# Patient Record
Sex: MALE | Race: WHITE | NOT HISPANIC OR LATINO | Employment: FULL TIME | URBAN - METROPOLITAN AREA
[De-identification: names, ages, dates, MRNs, and addresses within clinical notes are randomized per-mention and may not be internally consistent; named-entity substitution may affect disease eponyms.]

---

## 2018-01-17 ENCOUNTER — TELEPHONE (OUTPATIENT)
Dept: FAMILY MEDICINE | Facility: CLINIC | Age: 58
End: 2018-01-17

## 2020-03-02 ENCOUNTER — TELEPHONE (OUTPATIENT)
Dept: ORTHOPEDICS | Facility: CLINIC | Age: 60
End: 2020-03-02

## 2020-03-02 NOTE — TELEPHONE ENCOUNTER
----- Message from Shannon Tay MA sent at 3/2/2020  8:07 AM CST -----  Contact: DR Abdi Suarez    Wants to discuss coming in   Left hip pain

## 2020-03-02 NOTE — TELEPHONE ENCOUNTER
Called pt back. Advised that Dr. Zhu is in clinic Tuesday and Thursday, his NP is in Friday this week. 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 States that he may call back later to schedule appt. Thanks, Annamarie

## 2022-08-08 ENCOUNTER — OFFICE VISIT (OUTPATIENT)
Dept: ENDOCRINOLOGY | Facility: CLINIC | Age: 62
End: 2022-08-08
Payer: COMMERCIAL

## 2022-08-08 VITALS
DIASTOLIC BLOOD PRESSURE: 62 MMHG | HEIGHT: 70 IN | WEIGHT: 190.5 LBS | BODY MASS INDEX: 27.27 KG/M2 | OXYGEN SATURATION: 98 % | HEART RATE: 66 BPM | SYSTOLIC BLOOD PRESSURE: 138 MMHG

## 2022-08-08 DIAGNOSIS — R73.03 PREDIABETES: Primary | ICD-10-CM

## 2022-08-08 PROCEDURE — 1159F PR MEDICATION LIST DOCUMENTED IN MEDICAL RECORD: ICD-10-PCS | Mod: CPTII,S$GLB,, | Performed by: INTERNAL MEDICINE

## 2022-08-08 PROCEDURE — 1160F RVW MEDS BY RX/DR IN RCRD: CPT | Mod: CPTII,S$GLB,, | Performed by: INTERNAL MEDICINE

## 2022-08-08 PROCEDURE — 3075F PR MOST RECENT SYSTOLIC BLOOD PRESS GE 130-139MM HG: ICD-10-PCS | Mod: CPTII,S$GLB,, | Performed by: INTERNAL MEDICINE

## 2022-08-08 PROCEDURE — 99999 PR PBB SHADOW E&M-EST. PATIENT-LVL IV: CPT | Mod: PBBFAC,,, | Performed by: INTERNAL MEDICINE

## 2022-08-08 PROCEDURE — 1159F MED LIST DOCD IN RCRD: CPT | Mod: CPTII,S$GLB,, | Performed by: INTERNAL MEDICINE

## 2022-08-08 PROCEDURE — 99999 PR PBB SHADOW E&M-EST. PATIENT-LVL IV: ICD-10-PCS | Mod: PBBFAC,,, | Performed by: INTERNAL MEDICINE

## 2022-08-08 PROCEDURE — 3008F BODY MASS INDEX DOCD: CPT | Mod: CPTII,S$GLB,, | Performed by: INTERNAL MEDICINE

## 2022-08-08 PROCEDURE — 3008F PR BODY MASS INDEX (BMI) DOCUMENTED: ICD-10-PCS | Mod: CPTII,S$GLB,, | Performed by: INTERNAL MEDICINE

## 2022-08-08 PROCEDURE — 1160F PR REVIEW ALL MEDS BY PRESCRIBER/CLIN PHARMACIST DOCUMENTED: ICD-10-PCS | Mod: CPTII,S$GLB,, | Performed by: INTERNAL MEDICINE

## 2022-08-08 PROCEDURE — 3078F PR MOST RECENT DIASTOLIC BLOOD PRESSURE < 80 MM HG: ICD-10-PCS | Mod: CPTII,S$GLB,, | Performed by: INTERNAL MEDICINE

## 2022-08-08 PROCEDURE — 99203 PR OFFICE/OUTPT VISIT, NEW, LEVL III, 30-44 MIN: ICD-10-PCS | Mod: S$GLB,,, | Performed by: INTERNAL MEDICINE

## 2022-08-08 PROCEDURE — 99203 OFFICE O/P NEW LOW 30 MIN: CPT | Mod: S$GLB,,, | Performed by: INTERNAL MEDICINE

## 2022-08-08 PROCEDURE — 3078F DIAST BP <80 MM HG: CPT | Mod: CPTII,S$GLB,, | Performed by: INTERNAL MEDICINE

## 2022-08-08 PROCEDURE — 3075F SYST BP GE 130 - 139MM HG: CPT | Mod: CPTII,S$GLB,, | Performed by: INTERNAL MEDICINE

## 2022-08-08 RX ORDER — CLONAZEPAM 0.5 MG/1
0.5 TABLET ORAL 2 TIMES DAILY PRN
COMMUNITY

## 2022-08-08 RX ORDER — DILTIAZEM HYDROCHLORIDE 180 MG/1
180 CAPSULE, EXTENDED RELEASE ORAL DAILY
COMMUNITY

## 2022-08-08 RX ORDER — MELATONIN 3 MG
2 CAPSULE ORAL NIGHTLY PRN
COMMUNITY

## 2022-08-08 RX ORDER — FAMOTIDINE 20 MG/1
40 TABLET, FILM COATED ORAL NIGHTLY
COMMUNITY

## 2022-08-08 RX ORDER — UBIDECARENONE 75 MG
500 CAPSULE ORAL DAILY
COMMUNITY

## 2022-08-08 RX ORDER — ALFUZOSIN HYDROCHLORIDE 10 MG/1
10 TABLET, EXTENDED RELEASE ORAL
COMMUNITY

## 2022-08-12 ENCOUNTER — LAB VISIT (OUTPATIENT)
Dept: LAB | Facility: HOSPITAL | Age: 62
End: 2022-08-12
Attending: INTERNAL MEDICINE
Payer: COMMERCIAL

## 2022-08-12 DIAGNOSIS — R73.03 PREDIABETES: ICD-10-CM

## 2022-08-12 LAB
ALBUMIN SERPL BCP-MCNC: 4.1 G/DL (ref 3.5–5.2)
ALP SERPL-CCNC: 51 U/L (ref 55–135)
ALT SERPL W/O P-5'-P-CCNC: 28 U/L (ref 10–44)
ANION GAP SERPL CALC-SCNC: 9 MMOL/L (ref 8–16)
AST SERPL-CCNC: 23 U/L (ref 10–40)
BILIRUB SERPL-MCNC: 1.5 MG/DL (ref 0.1–1)
BUN SERPL-MCNC: 22 MG/DL (ref 8–23)
CALCIUM SERPL-MCNC: 9.7 MG/DL (ref 8.7–10.5)
CHLORIDE SERPL-SCNC: 103 MMOL/L (ref 95–110)
CHOLEST SERPL-MCNC: 123 MG/DL (ref 120–199)
CHOLEST/HDLC SERPL: 2.8 {RATIO} (ref 2–5)
CO2 SERPL-SCNC: 27 MMOL/L (ref 23–29)
CREAT SERPL-MCNC: 1 MG/DL (ref 0.5–1.4)
EST. GFR  (NO RACE VARIABLE): >60 ML/MIN/1.73 M^2
GLUCOSE SERPL-MCNC: 102 MG/DL (ref 70–110)
HDLC SERPL-MCNC: 44 MG/DL (ref 40–75)
HDLC SERPL: 35.8 % (ref 20–50)
LDLC SERPL CALC-MCNC: 64.8 MG/DL (ref 63–159)
NONHDLC SERPL-MCNC: 79 MG/DL
POTASSIUM SERPL-SCNC: 3.9 MMOL/L (ref 3.5–5.1)
PROT SERPL-MCNC: 7.3 G/DL (ref 6–8.4)
SODIUM SERPL-SCNC: 139 MMOL/L (ref 136–145)
TRIGL SERPL-MCNC: 71 MG/DL (ref 30–150)
TSH SERPL DL<=0.005 MIU/L-ACNC: 2.92 UIU/ML (ref 0.4–4)

## 2022-08-12 PROCEDURE — 82530 CORTISOL FREE: CPT | Performed by: INTERNAL MEDICINE

## 2022-08-12 PROCEDURE — 80053 COMPREHEN METABOLIC PANEL: CPT | Performed by: INTERNAL MEDICINE

## 2022-08-12 PROCEDURE — 80061 LIPID PANEL: CPT | Performed by: INTERNAL MEDICINE

## 2022-08-12 PROCEDURE — 84443 ASSAY THYROID STIM HORMONE: CPT | Performed by: INTERNAL MEDICINE

## 2022-08-12 PROCEDURE — 36415 COLL VENOUS BLD VENIPUNCTURE: CPT | Mod: PO | Performed by: INTERNAL MEDICINE

## 2022-08-14 ENCOUNTER — PATIENT MESSAGE (OUTPATIENT)
Dept: ENDOCRINOLOGY | Facility: CLINIC | Age: 62
End: 2022-08-14
Payer: COMMERCIAL

## 2022-08-16 LAB
COLLECT DURATION TIME UR: 24 H
CORTIS 24H UR-MRATE: 27 MCG/24 H (ref 3.5–45)
SPECIMEN VOL ?TM UR: 1300 ML

## 2022-08-17 ENCOUNTER — PATIENT MESSAGE (OUTPATIENT)
Dept: ENDOCRINOLOGY | Facility: CLINIC | Age: 62
End: 2022-08-17
Payer: COMMERCIAL

## 2023-08-22 DIAGNOSIS — C61 CANCER OF PROSTATE: Primary | ICD-10-CM

## 2023-08-23 ENCOUNTER — CLINICAL SUPPORT (OUTPATIENT)
Dept: REHABILITATION | Facility: HOSPITAL | Age: 63
End: 2023-08-23
Payer: COMMERCIAL

## 2023-08-23 DIAGNOSIS — N39.3 SUI (STRESS URINARY INCONTINENCE), MALE: ICD-10-CM

## 2023-08-23 DIAGNOSIS — R27.8 OTHER LACK OF COORDINATION: ICD-10-CM

## 2023-08-23 DIAGNOSIS — M62.89 PELVIC FLOOR TENSION: ICD-10-CM

## 2023-08-23 DIAGNOSIS — C61 CANCER OF PROSTATE: ICD-10-CM

## 2023-08-23 PROCEDURE — 97110 THERAPEUTIC EXERCISES: CPT | Mod: PN

## 2023-08-23 PROCEDURE — 97161 PT EVAL LOW COMPLEX 20 MIN: CPT | Mod: PN

## 2023-08-23 PROCEDURE — 97112 NEUROMUSCULAR REEDUCATION: CPT | Mod: PN

## 2023-08-23 NOTE — PLAN OF CARE
[facility]  Pelvic Health Physical Therapy Initial Evaluation    Date: 8/23/2023   Name: Anupam Suarez  [facility] Number: [id number]  Therapy Diagnosis:   Encounter Diagnoses   Name Primary?    Cancer of prostate     Pelvic floor tension     CHLOE (stress urinary incontinence), male     Other lack of coordination      Physician: Johnathan Recinos*    Physician Orders: PT Eval and Treat   Medical Diagnosis from Referral:   C61 (ICD-10-CM) - Cancer of prostate  Evaluation Date: 8/23/2023  Authorization Period Expiration: 12/31/23  Plan of Care Expiration: 11/30/23  Progress Note Due: 9/23/23  Visit # / Visits authorized: 1/ 1    FOTO: Issued Visit # 1    Time In: 12:00  Time Out: 12:55  Total Appointment Time (timed & untimed codes): 55 minutes    Precautions: universal and hx of prostate CA    Subjective     Date of onset: 7/24/23    History of current condition - Abdi reports: s/p RALP surgery 7/24/23  Developed post- op ileus and was re-admitted for 2 days. Catheter removed post-op 8 days.     Reports CHLOE - lifting leg to put on underwear, coming to stand, coming from supine to sit  Gets urge and is usually able to hold urine on way to toilet.  Sits to urinate.  +Post void dribble    in perineal area  Avid cyclist - no biking for 6 weeks per surgeon.     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 Yes  ED? Yes - taking Cialis prn (had ED prior to surgery)  Able to orgasm? Yes    Bladder/Bowel History:   Frequency of urination:   Daytime: every 2 hours            Nighttime: 3x  Difficulty initiating urine stream: No  Urine stream: strong but feels different - stings a little  Complete emptying: Yes but has post void dribble  Bladder leakage: Yes  Frequency of incidents: Daily   Amount leaked (urine): few drops, small squirt , and large squirt  Urinary Urgency: No  Able to delay the  urge for at least 3 minute(s).  Frequency of bowel movements: once a day  Difficulty initiating BM: No  Quality/Shape of BM: Tok Stool Chart 4-5  Does Patient Feel Empty after BM? Yes  Fiber Supplements or Laxative Use?  No  Colon leakage: No  Form of protection: pad in brief  Number of pads required in 24 hours: 4-5 pads and 1-2 brief    PAIN:  Location: penis and perineal  Current 0/10, worst 2/10, best 0/10   Description: Deep  Aggravating Factors/Activities that cause symptoms: penile with urination; perineal with sitting prolonged periods  Easing Factors: penile pain relieved after urination; perineal pain relieved by not sitting     Medical History: Abdi  has a past medical history of GERD (gastroesophageal reflux disease) and Hypertension.     Surgical History:  Anupam Suarez  has a past surgical history that includes Inguinal hernia repair (Bilateral) and Appendectomy.    Medications: Anupam has a current medication list which includes the following prescription(s): alfuzosin, amlodipine, cholecalciferol (vitamin d3), clonazepam, cyanocobalamin, diltiazem, famotidine, hydrochlorothiazide, hydrocodone-acetaminophen, ibuprofen, melatonin, pantoprazole, rosuvastatin, tramadol, and zinc acetate.    Allergies: Review of patient's allergies indicates:  No Known Allergies     Imaging MRI studies- prostate  5/10/2023 - prior to surgery:   IMPRESSION:    1.  PIRADS 5 - Very high (clinically significant cancer is highly likely to be present).    2.  Additional findings above.      Prior Therapy/Previous treatment included: Surgical excision of prostate; no further treatment required  Social History:  lives with their spouse  Current exercise:None presently due to post-op restrictions. 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  Prior Level of Function: Very active - biking and weight training  Current Level of Function: Not  exercising due to post-op restrictions; leaks with sit to stand, dressing lower body    Types of fluid intake: coffee 1 cup; water; Stees organic green tea/lemonade  Diet: Intermittent fasting   Habitus:well developed, well nourished  Abuse/Neglect: tbd    Pts goals: Get down to 0-1 pad a day; improve sexual function; get back to exercising.    OBJECTIVE     See EMR under MEDIA for written consent provided 8/23/2023  Chaperone: declined    ORTHO SCREEN  Posture in sitting: sits asymmetrically with weight shifted off of right ischial tuberosity   Posture in standing: decreased lordosis  Pelvic alignment: no sign of deviations noted in supine   SI Joint Palpation: Denies tenderness to SI joint palpation bilaterally.  Sacral spring test: negative (Positive=NO spring)  Adductor Palpation: nt    ABDOMINAL WALL ASSESSMENT  Palpation: tender under midline incision inferior to umbilicus  Abdominal strength: Rectus abdominus: 4+/5     Transverse abdominus: 4/5  Scarring: portal incisions healing well  Pelvic Floor Muscle and Transverse Abdominus Synergy: present  Diastasis: present - 2 finger width above umbilicus     BREATHING MECHANICS ASSESSMENT   Thorax Assessment During Quiet Respiration: WNL excursion of abdominal wall and Decreased excursion bilaterally of lateral ribs   Thorax Assessment During Deep Respiration: WNL excursion of abdominal wall and Decreased excursion bilaterally of lateral ribs   RECTAL PELVIC FLOOR EXAM    EXTERNAL ASSESSMENT  Anus: WNL  Skin condition: WNL   Scarring: none  Sensation: WNL   Pain:  none  Voluntary contraction: visible lift  Voluntary relaxation: visible drop  Involuntary contraction: bulge  Bearing down: bulge  Anal Ames: intact  Discharge: none       INTERNAL ASSESSMENT  EAS tone: hypotonic   Impaction: none   Pain: none  Sensation: able to localize pressure appropriately   Muscle Bulk: mild hypertonus B ileococcygeus and puborectalis  Muscle Power: 4+/5  Muscle Endurance: 5  "sec  # Reps To Fatigue: 5    Fast Contractions in 10 seconds: nt     Quality of contraction: slow relaxation and incomplete relaxation   Specificity: WNL  Coordination: tends to hold breath during PFM contration       Limitation/Restriction for FOTO Urinary Problem Survey    Therapist reviewed FOTO scores for [unfilled] on @ED@.   FOTO documents entered into aWhere - see Media section or FOTO account episode details.    Limitation Score: 60%            TREATMENT     Treatment Time In: 12:35  Treatment Time Out: 12:55  Total Treatment time (time-based codes) separate from Evaluation: 20 minutes      Therapeutic Exercise to develop  strength, endurance, and core stabilization for 10 minutes including:   TA sets 1x10 hold 5 sec  TA set with alternating heel slides 1x10 B  Kegel quick flicks x 10  Kegel endurance holds 10"x5      Neuromuscular re-education activities to develop Coordination, Control, Down training, and Proprioception for 10 minutes including: pelvic floor relaxation/bulging training, abdominal sets, diaphragmatic breathing, and pelvic floor contractions concentrating on anterior PFM      Patient Education provided:   general anatomy/physiology of urinary/ bowel  system and benefits of treatment were discussed with the pt. Additionally, bladder irritants, diaphragmatic breathing, isometric abdominal exercises, kegels, and transfers (log roll) to avoid excessive pf pressure were reviewed.     Home Exercises Provided:  Written Home Exercises Provided: yes.  Exercises were reviewed and Abdi was able to demonstrate them prior to the end of the session.    Abdi demonstrated good  understanding of the education provided.     See EMR under Patient Instructions for exercises provided 8/23/2023.    Assessment     Anupam is a 62 y.o. male referred to outpatient Physical Therapy with a medical diagnosis of post-prostatectomy CHLOE. Pt presents with adhered abdominal scar, decreased endurance of the pelvic muscles, " decreased phasic ability of the pelvic muscles, increased tension of the pelvic muscles, increased frequency of urination, increased nocturia, poor coordination of pelvic floor muscles during ADL's leading to urinary or fecal leakage, and dysfunctional voiding.     Pt prognosis is Excellent.   Pt will benefit from skilled outpatient Physical Therapy to address the deficits stated above and in the chart below, provide pt/family education, and to maximize pt's level of independence.     Plan of care discussed with patient: Yes  Pt's spiritual, cultural and educational needs considered and patient is agreeable to the plan of care and goals as stated below:       Anticipated Barriers for therapy: none    Medical Necessity is demonstrated by the following    History  Co-morbidities and personal factors that may impact the plan of care Co-morbidities:   HTN    Personal Factors:   no deficits     low   Examination  Body Structures and Functions, activity limitations and participation restrictions that may impact the plan of care Body Regions/Systems/Functions:  adhered abdominal scar, decreased endurance of the pelvic muscles, decreased phasic ability of the pelvic muscles, increased tension of the pelvic muscles, increased frequency of urination, increased nocturia, poor coordination of pelvic floor muscles during ADL's leading to urinary or fecal leakage, and dysfunctional voiding     Activity Limitations:  sleep uninterrupted by excessive nocturia, difficulty getting an erection , and incontinence with ADLs    Participation Restrictions:  all ADLs/iADLs uninterrupted by urinary incontinence/urgency/frequency, ADLs affected by inability to fully empty bladder , social activities with friends/family, relationship with spouse/partner, work duties, Sleep restrictions, exercise restrictions due to pain , and exercise restrictions due to incontinence     Activity limitations:   Learning and applying knowledge  no  deficits    General Tasks and Commands  no deficits    Communication  no deficits    Mobility  lifting and carrying objects    Self care  toileting    Domestic Life  doing house work (cleaning house, washing dishes, laundry)    Interactions/Relationships  intimate relationships    Life Areas  no deficits    Community and Social Life  community life  recreation and leisure       moderate   Clinical Presentation stable and uncomplicated low   Decision Making/ Complexity Score: low       Goals:  Short Term Goals: 6 weeks   Pt to be edu pelvic muscle bracing and be able to consistently perform correctly and quickly to help decrease incontinence with cough/laugh/sneeze.  Pt to report a decrease in pad usage to no more than 2 a day to demonstrate improving pelvic floor function needed for continence.  Pt to be able to perform a 5  second kegel x 10 reps to demonstrate improving strength and endurance needed for continence.  Pt to be able to delay the urge to urinate at least 5 minutes with a strong urge to urinate in order to make it to the bathroom without leaking.  Pt to report being able to transfer from sitting to standing without leakage of urine.  Pt to report a decrease in urinary frequency from every 2 hours to no more than once every 3 hours to improve ability to participate in social activities.  Pt to report being able to sneeze without incontinence demonstrating improved pelvic floor strength and coordination to improve confidence in social situations  Pt will return to biking without increased perineal pain.       Long Term Goals: 12 weeks  Pt to be discharged with home plan for carry over after discharge.    Pt to be able to perform a 10 second kegel x 10 reps to demonstrate improving strength and endurance needed for continence.  Pt to report a decrease in pad usage to 0-1 pads a day to demonstrate improving pelvic floor muscle controls as evidenced by decreased episodes of incontinence needed to improve  confidence in social situations.  Pt to be able to delay the urge to urinate at least 10 minutes with a strong urge to urinate in order to make it to the bathroom without leaking.  Pt to report no longer feeling the need to urinate just in case when shopping or participating in social activities to demonstrate improving pelvic floor and bladder control.  Pt to report elimination of incontinence with ADLs 6/7 days a week to demonstrate improved pelvic floor muscle strength and coordination  Pt to demonstrate an improved score in the FOTO Urinary Problem survey  to at least 53% limitation to demonstrate improving CHLOE.    Pt to increase pelvic floor strength to at least 5/5 to demonstrate improved strength needed for continence with ADLs.      Plan     Plan of care Certification: 8/23/2023 to 11/30/2023.    Outpatient Physical Therapy 1 time every other week for 8 weeks to include the following interventions: therapeutic exercises, therapeutic activity, neuromuscular re-education, manual therapy, patient/family education, and self care/home management    Rebeca Wagner, PT

## 2023-08-23 NOTE — PATIENT INSTRUCTIONS
"        Deep core/lower abdominals - tighten lower abdominals like firming a rubber band across front of hips. Can feel just inside hip bones for correct contraction. Can make "shhh" sound on exhale to feel for right muscles.             Home Exercise Program: 08/23/2023    Kegels    Quick Flicks   Perfor, a fast kegel (contract and LIFT the pelvic floor muscles as if you're trying to stop the stream of urine and passage of gas).    Make sure you're just using the internal muscles, not holding for longer than 1 second without holding your breath.  Let go and relax everything for 3-5 seconds.   Repeat 10 times, 3 sets per day.     Endurance Holds  Perform a long kegel (contract and LIFT the pelvic floor muscles as if you're trying to stop the stream of urine and passage of gas).    Make sure you're just using the internal muscles without holding your breath.  Let go and relax everything for 10 seconds.   Hold 10 seconds. Repeat 10 times, 3 sets per day.   "

## 2023-08-29 ENCOUNTER — CLINICAL SUPPORT (OUTPATIENT)
Dept: REHABILITATION | Facility: HOSPITAL | Age: 63
End: 2023-08-29
Payer: COMMERCIAL

## 2023-08-29 DIAGNOSIS — N39.3 SUI (STRESS URINARY INCONTINENCE), MALE: ICD-10-CM

## 2023-08-29 DIAGNOSIS — R27.8 OTHER LACK OF COORDINATION: ICD-10-CM

## 2023-08-29 DIAGNOSIS — M62.89 PELVIC FLOOR TENSION: Primary | ICD-10-CM

## 2023-08-29 PROCEDURE — 97112 NEUROMUSCULAR REEDUCATION: CPT | Mod: PN

## 2023-08-29 PROCEDURE — 97140 MANUAL THERAPY 1/> REGIONS: CPT | Mod: PN

## 2023-08-29 PROCEDURE — 97110 THERAPEUTIC EXERCISES: CPT | Mod: PN

## 2023-08-29 NOTE — PROGRESS NOTES
"  Pelvic Health Physical Therapy   Treatment Note     Name: Anupam Suarez  [facility] Number: [id number]    Therapy Diagnosis:   Encounter Diagnoses   Name Primary?    Pelvic floor tension Yes    CHLOE (stress urinary incontinence), male     Other lack of coordination      Physician: Johnathan Recinos*    Visit Date: 8/29/2023    Physician Orders: PT Eval and Treat   Medical Diagnosis from Referral:   C61 (ICD-10-CM) - Cancer of prostate  Evaluation Date: 8/23/2023  Authorization Period Expiration: 12/31/23  Plan of Care Expiration: 11/30/23  Progress Note Due: 9/23/23  Visit # / Visits authorized: 1/ 1     FOTO: Issued Visit # 1    Time In: 7:57  Time Out: 8:50  Total Billable Time: 53 minutes    Precautions: Standard and prostate CA    Subjective     Pt reports: Went back to work yesterday - 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 Had to change to pad 10x and change brief 1x. Had a cup of coffee in am and Kumbucha and then not much else; no water.   Worked on [employer] and did better doing that.   He was compliant with home exercise program.  Response to previous treatment: Initial eval  Functional change: none reported    Pain:   Location: penis and perineal  Current 0/10, worst 2/10, best 0/10     Constitutional Symptoms Review: The patient denies having any constitutional symptoms.     Objective   Pt verbally consents to intrarectal treatment today.  Signed consent form already on file.       Therapeutic Exercise to develop mobility, strength, endurance, and core stabilization for 38 minutes including:   TA sets 1x10 hold 5 sec  TA set with alternating heel slides 1x10 B  +TA set with march Level 1  +Bridges with TA 2x10  +Clams 1x10 hold 3 sec  +Piriformis stretch seated 1 min B  +Obturator internus stretch supine 1 min B  +Seated adductor stretch 1 min B  +Standing HS stretch 1 min      Kegel quick flicks x 10  Kegel endurance holds 10"x5        Neuromuscular re-education activities to develop Coordination, Control, Down training, and " Proprioception for 10 minutes including: pelvic floor relaxation/bulging training, abdominal sets, diaphragmatic breathing, and pelvic floor contractions concentrating on anterior PFM    Abdi received the following manual therapy techniques: to develop extensibility and desensitization for 9 minutes including: soft tissue mobilization of abdominal wall/scars and cupping of portal scars      Abdi participated in neuromuscular re-education activities to develop Coordination, Control, Down training, and Proprioception for 8 minutes including: pelvic floor relaxation/bulging training, diaphragmatic breathing with Kegel, and diaphragmatic breathing  -Breath coordination with exertion during ex  -TA/PFM co-contraction        Home Exercises Provided and Patient Education Provided     Education provided:   - general anatomy/physiology of urinary/ bowel  system and benefits of treatment were discussed with the pt. Additionally, bladder irritants, diaphragmatic breathing, isometric abdominal exercises, kegels, and transfers (log roll) to avoid excessive pf pressure were reviewed.   -scar massage at home  -HEP  -Breathing with transfers, activities during the day.    Discussed progression of plan of care with patient; educated pt in activity modification; reviewed HEP with pt. Pt demonstrated and verbalized understanding of all instruction and was provided with a handout of HEP (see Patient Instructions).      Written Home Exercises Provided: yes.  Exercises were reviewed and Abdi was able to demonstrate them prior to the end of the session.  Abdi demonstrated good  understanding of the education provided.     See EMR under Patient Instructions for exercises provided 8/29/2023.    Assessment     Abdi returns for initial treatment session following eval. Pt returned to work yesterday and saw 50 patients - reports increased urinary incontinence. Discussed posture in office chair, checking in to make sure he is not holding breath  or clenching glutes. Increased stress of return to work contributor in increased UI. Reviewed d breathing and PFM relaxation. Progressed ex to include pelvic/hip mobility stretches, abdominal (TA), glute and hip strengthening. Pt given handout of ex for HEP. Abdominal wall and scar mobilization/cupping performed with + response - improved mobility under healed incisions. Encouraged pt to perform scar massage at home.   Abdi Is progressing well towards his goals.   Pt prognosis is Excellent.     Pt will continue to benefit from skilled outpatient physical therapy to address the deficits listed in the problem list box on initial evaluation, provide pt/family education and to maximize pt's level of independence in the home and community environment.     Pt's spiritual, cultural and educational needs considered and pt agreeable to plan of care and goals.     Anticipated barriers to physical therapy: none    Goals:   Short Term Goals: 6 weeks   Pt to be edu pelvic muscle bracing and be able to consistently perform correctly and quickly to help decrease incontinence with cough/laugh/sneeze.  Pt to report a decrease in pad usage to no more than 2 a day to demonstrate improving pelvic floor function needed for continence.  Pt to be able to perform a 5  second kegel x 10 reps to demonstrate improving strength and endurance needed for continence.  Pt to be able to delay the urge to urinate at least 5 minutes with a strong urge to urinate in order to make it to the bathroom without leaking.  Pt to report being able to transfer from sitting to standing without leakage of urine.  Pt to report a decrease in urinary frequency from every 2 hours to no more than once every 3 hours to improve ability to participate in social activities.  Pt to report being able to sneeze without incontinence demonstrating improved pelvic floor strength and coordination to improve confidence in social situations  Pt will return to biking without  increased perineal pain.                 Long Term Goals: 12 weeks  Pt to be discharged with home plan for carry over after discharge.    Pt to be able to perform a 10 second kegel x 10 reps to demonstrate improving strength and endurance needed for continence.  Pt to report a decrease in pad usage to 0-1 pads a day to demonstrate improving pelvic floor muscle controls as evidenced by decreased episodes of incontinence needed to improve confidence in social situations.  Pt to be able to delay the urge to urinate at least 10 minutes with a strong urge to urinate in order to make it to the bathroom without leaking.  Pt to report no longer feeling the need to urinate just in case when shopping or participating in social activities to demonstrate improving pelvic floor and bladder control.  Pt to report elimination of incontinence with ADLs 6/7 days a week to demonstrate improved pelvic floor muscle strength and coordination  Pt to demonstrate an improved score in the FOTO Urinary Problem survey  to at least 53% limitation to demonstrate improving CHLOE.    Pt to increase pelvic floor strength to at least 5/5 to demonstrate improved strength needed for continence with ADLs.         Plan     Plan of care Certification: 8/23/2023 to 11/30/2023.     Outpatient Physical Therapy 1 time every other week for 8 weeks to include the following interventions: therapeutic exercises, therapeutic activity, neuromuscular re-education, manual therapy, patient/family education, and self care/home management    Rebeca Wagner, PT

## 2023-09-13 ENCOUNTER — CLINICAL SUPPORT (OUTPATIENT)
Dept: REHABILITATION | Facility: HOSPITAL | Age: 63
End: 2023-09-13
Payer: COMMERCIAL

## 2023-09-13 DIAGNOSIS — N39.3 SUI (STRESS URINARY INCONTINENCE), MALE: ICD-10-CM

## 2023-09-13 DIAGNOSIS — M62.89 PELVIC FLOOR TENSION: Primary | ICD-10-CM

## 2023-09-13 DIAGNOSIS — R27.8 OTHER LACK OF COORDINATION: ICD-10-CM

## 2023-09-13 PROCEDURE — 97140 MANUAL THERAPY 1/> REGIONS: CPT | Mod: PN

## 2023-09-13 PROCEDURE — 97112 NEUROMUSCULAR REEDUCATION: CPT | Mod: PN

## 2023-09-13 NOTE — PROGRESS NOTES
Pelvic Health Physical Therapy   Treatment Note     Name: Anupam Suarez  [facility] Number: [id number]    Therapy Diagnosis:   Encounter Diagnoses   Name Primary?    Pelvic floor tension Yes    CHLOE (stress urinary incontinence), male     Other lack of coordination      Physician: Johnathan Recinos*    Visit Date: 9/13/2023    Physician Orders: PT Eval and Treat   Medical Diagnosis from Referral:   C61 (ICD-10-CM) - Cancer of prostate  Evaluation Date: 8/23/2023  Authorization Period Expiration: 12/31/23  Plan of Care Expiration: 11/30/23  Progress Note Due: 9/23/23  Visit # / Visits authorized: 2/20     FOTO: Issued Visit # 1    Time In: 3:05  Time Out: 4:00  Total Billable Time: 55 minutes    Precautions: Standard and prostate CA    Subjective     Pt reports: Has done exercises 11 days out of 14  Will start riding his bike next week. Got a pad for his bike seat.   Notices he leaks when lifting his leg up - ex: getting pants on.    Biggest issue is that he leaks when standing. Notices it mostly when at work.  Went birding in [facility] - was able to have 1 pad the whole time.  Still getting up 2-3x at  night - but not as wet in am though    He was compliant with home exercise program.  Response to previous treatment: No adverse effects  Functional change: not as wet at night; getting more control when walking to bathroom    Pain:   Location: penis and perineal  Current 0/10, worst 2/10, best 0/10     Constitutional Symptoms Review: The patient denies having any constitutional symptoms.     Objective   Pt verbally consents to intrarectal treatment today.  Signed consent form already on file.       Therapeutic Exercise to develop mobility, strength, endurance, and core stabilization for 5 minutes including:   Psoas stretch in 1/2 kneel B    Not performed:  TA sets 1x10 hold 5 sec  TA set with alternating heel slides 1x10 B  TA set with march Level 1  Bridges with TA 2x10  Clams 1x10 hold 3 sec  Piriformis stretch  "seated 1 min B  Obturator internus stretch supine 1 min B  Seated adductor stretch 1 min B  Standing HS stretch 1 min  Kegel quick flicks x 10  Kegel endurance holds 10"x5        Neuromuscular re-education activities to develop Coordination, Control, Down training, and Proprioception for 38 minutes including: pelvic floor relaxation/bulging training, abdominal sets, diaphragmatic breathing, and pelvic floor contractions concentrating on anterior PFM  -NMRE with EMG biofeedback using external anal sensors. Pt with avg resting tone of 1.9 microvolts. Performed endurance holds x20 reps on 10"/10" W/R cycle. Avg muscle recruitment was 12.1mv  Pt did have fatigue after first 10 reps.    -Educated in performing Kegels in standing for added challenge to PFM. Also discussed "blow as you go" when coming to stand.     Abdi received the following manual therapy techniques: to develop extensibility and desensitization for 12 minutes including: soft tissue mobilization of abdominal wall/scars and cupping of portal scars  and abdominal wall.        Home Exercises Provided and Patient Education Provided     Education provided:   - general anatomy/physiology of urinary/ bowel  system and benefits of treatment were discussed with the pt. Additionally, bladder irritants, diaphragmatic breathing, isometric abdominal exercises, kegels, and transfers (log roll) to avoid excessive pf pressure were reviewed.   -scar massage at home  -HEP  -Breathing with transfers, activities during the day.      Discussed progression of plan of care with patient; educated pt in activity modification; reviewed HEP with pt. Pt demonstrated and verbalized understanding of all instruction and was provided with a handout of HEP (see Patient Instructions).      Written Home Exercises Provided: yes.  Exercises were reviewed and Abdi was able to demonstrate them prior to the end of the session.  Abdi demonstrated good  understanding of the education provided. "     See EMR under Patient Instructions for exercises provided 8/29/2023, 9/13/23    Assessment     Abdi reports some improvement in volume of leakage particularly at night. Notes issues occur mostly when standing at work. Pt to begin performing Kegel HEP in standing. Also reviewed breathing and performing Kegel with sit to stand.  EMG biofeedback used today for visual feedback during endurance holds. Abdominal wall and scar mobilization/cupping performed with + response - improved mobility under healed incisions. Added psoas stretch to HEP for hip mobility.    Abdi Is progressing well towards his goals.   Pt prognosis is Excellent.     Pt will continue to benefit from skilled outpatient physical therapy to address the deficits listed in the problem list box on initial evaluation, provide pt/family education and to maximize pt's level of independence in the home and community environment.     Pt's spiritual, cultural and educational needs considered and pt agreeable to plan of care and goals.     Anticipated barriers to physical therapy: none    Goals:   Short Term Goals: 6 weeks   Pt to be edu pelvic muscle bracing and be able to consistently perform correctly and quickly to help decrease incontinence with cough/laugh/sneeze.  Pt to report a decrease in pad usage to no more than 2 a day to demonstrate improving pelvic floor function needed for continence.  Pt to be able to perform a 5  second kegel x 10 reps to demonstrate improving strength and endurance needed for continence.  Pt to be able to delay the urge to urinate at least 5 minutes with a strong urge to urinate in order to make it to the bathroom without leaking.  Pt to report being able to transfer from sitting to standing without leakage of urine.  Pt to report a decrease in urinary frequency from every 2 hours to no more than once every 3 hours to improve ability to participate in social activities.  Pt to report being able to sneeze without incontinence  demonstrating improved pelvic floor strength and coordination to improve confidence in social situations  Pt will return to biking without increased perineal pain.                 Long Term Goals: 12 weeks  Pt to be discharged with home plan for carry over after discharge.    Pt to be able to perform a 10 second kegel x 10 reps to demonstrate improving strength and endurance needed for continence.  Pt to report a decrease in pad usage to 0-1 pads a day to demonstrate improving pelvic floor muscle controls as evidenced by decreased episodes of incontinence needed to improve confidence in social situations.  Pt to be able to delay the urge to urinate at least 10 minutes with a strong urge to urinate in order to make it to the bathroom without leaking.  Pt to report no longer feeling the need to urinate just in case when shopping or participating in social activities to demonstrate improving pelvic floor and bladder control.  Pt to report elimination of incontinence with ADLs 6/7 days a week to demonstrate improved pelvic floor muscle strength and coordination  Pt to demonstrate an improved score in the FOTO Urinary Problem survey  to at least 53% limitation to demonstrate improving CHLOE.    Pt to increase pelvic floor strength to at least 5/5 to demonstrate improved strength needed for continence with ADLs.         Plan     Plan of care Certification: 8/23/2023 to 11/30/2023.     Outpatient Physical Therapy 1 time every other week for 8 weeks to include the following interventions: therapeutic exercises, therapeutic activity, neuromuscular re-education, manual therapy, patient/family education, and self care/home management    Rebeca Wagner, PT

## 2023-09-27 ENCOUNTER — CLINICAL SUPPORT (OUTPATIENT)
Dept: REHABILITATION | Facility: HOSPITAL | Age: 63
End: 2023-09-27
Payer: COMMERCIAL

## 2023-09-27 DIAGNOSIS — M62.89 PELVIC FLOOR TENSION: Primary | ICD-10-CM

## 2023-09-27 DIAGNOSIS — R27.8 OTHER LACK OF COORDINATION: ICD-10-CM

## 2023-09-27 DIAGNOSIS — N39.3 SUI (STRESS URINARY INCONTINENCE), MALE: ICD-10-CM

## 2023-09-27 PROCEDURE — 97110 THERAPEUTIC EXERCISES: CPT | Mod: PN

## 2023-09-27 PROCEDURE — 97112 NEUROMUSCULAR REEDUCATION: CPT | Mod: PN

## 2023-09-27 NOTE — PROGRESS NOTES
Pelvic Health Physical Therapy   Treatment/ Progress Note     Name: Anupam Suarez  [facility] Number: [id number]    Therapy Diagnosis:   Encounter Diagnoses   Name Primary?    Pelvic floor tension Yes    CHLOE (stress urinary incontinence), male     Other lack of coordination        Physician: Johnathan Recinos*    Visit Date: 9/27/2023    Physician Orders: PT Eval and Treat   Medical Diagnosis from Referral:   C61 (ICD-10-CM) - Cancer of prostate  Evaluation Date: 8/23/2023  Authorization Period Expiration: 12/31/23  Plan of Care Expiration: 11/30/23  Progress Note Due: 9/23/23  Visit # / Visits authorized: 3/20     FOTO: Issued Visit # 1,3    Time In: 3:00  Time Out: 3:45  Total Billable Time: 45 minutes    Precautions: Standard and prostate CA    Subjective     Pt reports:  Can drive 3 hours and stops 2x to void.   Main issue is still standing at work. 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 Going through 10 pads/day on working days.   Still getting up 3x at night to void. Thinks bladder wakes him.   He is voiding frequently during the day so he doesn't leak as much.  Started riding bike - rode gradual increase in distance - rode 3.5 miles today.   Walking 7 flights of stairs/daily - thinks he leaks a little bit with this.   Still leaking when lifting leg to don pants.     Frequency of urination:   Daytime: every 2 hours                                           Nighttime: 3x  Difficulty initiating urine stream: No  Urine stream: strong   Complete emptying: Yes but has post void dribble  Bladder leakage: Yes  Frequency of incidents: Daily   Amount leaked (urine): few drops, small squirt , and large squirt  Urinary Urgency: No  Able to delay the urge for at least 3 minute(s).  Frequency of bowel movements: once a day  Difficulty initiating BM: No  Quality/Shape of BM: [place] Stool Chart 4-5  Does Patient Feel Empty after BM? Yes  Fiber Supplements or Laxative Use?  No  Colon  "leakage: No  Form of protection: pad in brief  Number of pads required in 24 hours: 3 pads and 1 brief work;  10 pads and 2 briefs on a work day.     He was compliant with home exercise program.  Response to previous treatment: No adverse effects  Functional change: not as wet at night; getting more control when walking to bathroom    Pain:   Location: penis and perineal  Current 0/10, worst 2/10, best 0/10     Constitutional Symptoms Review: The patient denies having any constitutional symptoms.     Objective   Pt verbally consents to intrarectal treatment today.  Signed consent form already on file.      INTERNAL ASSESSMENT  EAS tone: hypotonic   Impaction: none   Pain: none  Sensation: able to localize pressure appropriately   Muscle Bulk: mild hypertonus B ileococcygeus and puborectalis  Muscle Power: 4+/5  Muscle Endurance: 5 sec  # Reps To Fatigue: 10    Fast Contractions in 10 seconds: nt                          Quality of contraction: slow relaxation and incomplete relaxation   Specificity: WNL  Coordination: tends to hold breath during PFM contration       Therapeutic Exercise to develop mobility, strength, endurance, and core stabilization for 35 minutes including:   TA set with march Level 2 4x5 reps  Bridges with TA  2x10  Clams With RTB 1x10 hold 3 sec (NP)  +Wall sit with PFM sets 1x10  +Squats with 10# 2x10     Next:  +Dead lifts  +Lateral band walks    Not performed:  Psoas stretch in 1/2 kneel B  Piriformis stretch seated 1 min B  Obturator internus stretch supine 1 min B  Seated adductor stretch 1 min B  Standing HS stretch 1 min  Kegel quick flicks x 10  Kegel endurance holds 10"x5        Neuromuscular re-education activities to develop Coordination, Control, Down training, and Proprioception for 10 minutes including: pelvic floor relaxation/bulging training, abdominal sets, diaphragmatic breathing, and pelvic floor contractions concentrating on anterior PFM  --Reviewed Kegels in standing for " "added challenge to PFM. Pt is leaking with standing PFM contraction - had him not concentrate so much on breathing pattern but just breathe when doing Kegel and do a 50% Kegel - this helped with leaks.    Not performed:  -NMRE with EMG biofeedback using external anal sensors. Pt with avg resting tone of 1.9 microvolts. Performed endurance holds x20 reps on 10"/10" W/R cycle. Avg muscle recruitment was 12.1mv  Pt did have fatigue after first 10 reps.         Abdi received the following manual therapy techniques: to develop extensibility and desensitization for 0 minutes including: soft tissue mobilization of abdominal wall/scars and cupping of portal scars  and abdominal wall.        Home Exercises Provided and Patient Education Provided     Education provided:   - general anatomy/physiology of urinary/ bowel  system and benefits of treatment were discussed with the pt. Additionally, bladder irritants, diaphragmatic breathing, isometric abdominal exercises, kegels, and transfers (log roll) to avoid excessive pf pressure were reviewed.   -scar massage at home  -HEP  -Breathing with transfers, activities during the day.      Discussed progression of plan of care with patient; educated pt in activity modification; reviewed HEP with pt. Pt demonstrated and verbalized understanding of all instruction and was provided with a handout of HEP (see Patient Instructions).      Written Home Exercises Provided: yes.  Exercises were reviewed and Abdi was able to demonstrate them prior to the end of the session.  Abdi demonstrated good  understanding of the education provided.     See EMR under Patient Instructions for exercises provided 8/29/2023, 9/13/23, 9/27/23    Assessment     Abdi reports some improved CHLOE at home but is using 10 pads/day on work days. Works 12 hour shifts. Notes issues occur mostly when standing at work. Pt is very focused on his breathing and seems to be bearing down when doing PFM contractions in " standing. Worked on more relaxed breathing pattern and performing gentle PFM contraction. Also discussed using urge delay strategies to extend void window and decrease night time voids. He has met 2 STG.  Abdi Is progressing well towards his goals.   Pt prognosis is Excellent.     Pt will continue to benefit from skilled outpatient physical therapy to address the deficits listed in the problem list box on initial evaluation, provide pt/family education and to maximize pt's level of independence in the home and community environment.     Pt's spiritual, cultural and educational needs considered and pt agreeable to plan of care and goals.     Anticipated barriers to physical therapy: none    Goals:   Short Term Goals: 6 weeks   Pt to be edu pelvic muscle bracing and be able to consistently perform correctly and quickly to help decrease incontinence with cough/laugh/sneeze.  Pt to report a decrease in pad usage to no more than 2 a day to demonstrate improving pelvic floor function needed for continence.  Pt to be able to perform a 5  second kegel x 10 reps to demonstrate improving strength and endurance needed for continence. (Met)  Pt to be able to delay the urge to urinate at least 5 minutes with a strong urge to urinate in order to make it to the bathroom without leaking.  Pt to report being able to transfer from sitting to standing without leakage of urine.  Pt to report a decrease in urinary frequency from every 2 hours to no more than once every 3 hours to improve ability to participate in social activities.  Pt to report being able to sneeze without incontinence demonstrating improved pelvic floor strength and coordination to improve confidence in social situations  Pt will return to biking without increased perineal pain. (Met)                Long Term Goals: 12 weeks  Pt to be discharged with home plan for carry over after discharge.    Pt to be able to perform a 10 second kegel x 10 reps to demonstrate  improving strength and endurance needed for continence.  Pt to report a decrease in pad usage to 0-1 pads a day to demonstrate improving pelvic floor muscle controls as evidenced by decreased episodes of incontinence needed to improve confidence in social situations.  Pt to be able to delay the urge to urinate at least 10 minutes with a strong urge to urinate in order to make it to the bathroom without leaking.  Pt to report no longer feeling the need to urinate just in case when shopping or participating in social activities to demonstrate improving pelvic floor and bladder control.  Pt to report elimination of incontinence with ADLs 6/7 days a week to demonstrate improved pelvic floor muscle strength and coordination  Pt to demonstrate an improved score in the FOTO Urinary Problem survey  to at least 53% limitation to demonstrate improving CHLOE.    Pt to increase pelvic floor strength to at least 5/5 to demonstrate improved strength needed for continence with ADLs.         Plan     Plan of care Certification: 8/23/2023 to 11/30/2023.     Outpatient Physical Therapy 1 time every other week for 8 weeks to include the following interventions: therapeutic exercises, therapeutic activity, neuromuscular re-education, manual therapy, patient/family education, and self care/home management    Rebeca Wagner, PT

## 2023-10-10 ENCOUNTER — CLINICAL SUPPORT (OUTPATIENT)
Dept: REHABILITATION | Facility: HOSPITAL | Age: 63
End: 2023-10-10
Payer: COMMERCIAL

## 2023-10-10 DIAGNOSIS — N39.3 SUI (STRESS URINARY INCONTINENCE), MALE: ICD-10-CM

## 2023-10-10 DIAGNOSIS — R27.8 OTHER LACK OF COORDINATION: ICD-10-CM

## 2023-10-10 DIAGNOSIS — M62.89 PELVIC FLOOR TENSION: Primary | ICD-10-CM

## 2023-10-10 PROCEDURE — 97530 THERAPEUTIC ACTIVITIES: CPT | Mod: PN

## 2023-10-10 PROCEDURE — 97110 THERAPEUTIC EXERCISES: CPT | Mod: PN

## 2023-10-10 NOTE — PROGRESS NOTES
Pelvic Health Physical Therapy   Treatment/ Progress Note     Name: Anupam Suarez  [facility] Number: [id number]    Therapy Diagnosis:   Encounter Diagnoses   Name Primary?    Pelvic floor tension Yes    CHLOE (stress urinary incontinence), male     Other lack of coordination        Physician: Johnathan Recinos*    Visit Date: 10/10/2023    Physician Orders: PT Eval and Treat   Medical Diagnosis from Referral:   C61 (ICD-10-CM) - Cancer of prostate  Evaluation Date: 8/23/2023  Authorization Period Expiration: 12/31/23  Plan of Care Expiration: 11/30/23  Progress Note Due: 10/27/23  Visit # / Visits authorized: 4/20     FOTO: Issued Visit # 1,3    Time In: 11:00  Time Out: 11:45  Total Billable Time: 45 minutes    Precautions: Standard and prostate CA    Subjective     Pt reports:  Doing better with nocturia - down to 2x/night  Rode 5.5 mi this am - no leaks. Able to step walk up 10 flights of stairs without leaks.   Primarily leaks while at work - with sit to stand and with standing in pt treatment rooms.   He is agreeable to try clamp protocol.     He was compliant with home exercise program.  Response to previous treatment: No adverse effects  Functional change: not as wet at night; getting more control when walking to bathroom; improving nocturia; has returned to biking    Pain:   Location: penis and perineal  Current 0/10, worst 1/10, best 0/10     Constitutional Symptoms Review: The patient denies having any constitutional symptoms.     Objective   Pt verbally consents to intrarectal treatment today.  Signed consent form already on file.      Therapeutic Exercise to develop mobility, strength, endurance, and core stabilization for 35 minutes including:   Piriformis stretch seated 1 min B  Obturator internus stretch supine 1 min B  TA set with march Level 2 [id number] (cues for TA engagement)  Bridges with Kegel  x10 and x10 without Kegel   Wall sit with PFM sets 1x10  Squats with 10# 2x10   +Dead lifts with 10# KB  "2x10    Next:  +Lateral band walks    Not performed:  Clams With RTB 1x10 hold 3 sec  Psoas stretch in 1/2 kneel B    Seated adductor stretch 1 min B  Standing HS stretch 1 min  Kegel quick flicks x 10  Kegel endurance holds 10"x5    Therapeutic Activity to improve CHLOE for 10 min:   Pt instructed in use of Tone penile clamp for bladder/incontinence training. Pt given Shakira Heios clamp protocol. He is wear clamp 6 days/wk with one day off. Initially start with 2 hour void window then gradually increase to 3 hours.   Do not wear at  night.         Neuromuscular re-education activities to develop Coordination, Control, Down training, and Proprioception for 0 minutes including: pelvic floor relaxation/bulging training, abdominal sets, diaphragmatic breathing, and pelvic floor contractions concentrating on anterior PFM  --Reviewed Kegels in standing for added challenge to PFM. Pt is leaking with standing PFM contraction - had him not concentrate so much on breathing pattern but just breathe when doing Kegel and do a 50% Kegel - this helped with leaks.    Not performed:  -NMRE with EMG biofeedback using external anal sensors. Pt with avg resting tone of 1.9 microvolts. Performed endurance holds x20 reps on 10"/10" W/R cycle. Avg muscle recruitment was 12.1mv  Pt did have fatigue after first 10 reps.         Abdi received the following manual therapy techniques: to develop extensibility and desensitization for 0 minutes including: soft tissue mobilization of abdominal wall/scars and cupping of portal scars  and abdominal wall.        Home Exercises Provided and Patient Education Provided     Education provided:   - general anatomy/physiology of urinary/ bowel  system and benefits of treatment were discussed with the pt. Additionally, bladder irritants, diaphragmatic breathing, isometric abdominal exercises, kegels, and transfers (log roll) to avoid excessive pf pressure were reviewed.   -scar massage at " home  -HEP  -Breathing with transfers, activities during the day.  -Shakira Milios clamp protocol (see Media for handout)      Discussed progression of plan of care with patient; educated pt in activity modification; reviewed HEP with pt. Pt demonstrated and verbalized understanding of all instruction and was provided with a handout of HEP (see Patient Instructions).      Written Home Exercises Provided: yes.  Exercises were reviewed and Abdi was able to demonstrate them prior to the end of the session.  Abdi demonstrated good  understanding of the education provided.     See EMR under Patient Instructions for exercises provided 8/29/2023, 9/13/23, 9/27/23    Assessment     Abdi reports improvement in nocturia to 2x. Able to ride bike and climb stairs without CHLOE but leaks at work with sit to stand and standing in pt rooms. Still using 8-10 pads on work days.   Pt instructed in Shakira Milios clamp protocol and will purchase a Tone clamp.   Continued d breathing, Kegels, strengthening exercises for core.    He has met 2 STG.  Abdi Is progressing well towards his goals.   Pt prognosis is Excellent.     Pt will continue to benefit from skilled outpatient physical therapy to address the deficits listed in the problem list box on initial evaluation, provide pt/family education and to maximize pt's level of independence in the home and community environment.     Pt's spiritual, cultural and educational needs considered and pt agreeable to plan of care and goals.     Anticipated barriers to physical therapy: none    Goals:   Short Term Goals: 6 weeks   Pt to be edu pelvic muscle bracing and be able to consistently perform correctly and quickly to help decrease incontinence with cough/laugh/sneeze.  Pt to report a decrease in pad usage to no more than 2 a day to demonstrate improving pelvic floor function needed for continence.  Pt to be able to perform a 5  second kegel x 10 reps to demonstrate improving strength and endurance  needed for continence. (Met)  Pt to be able to delay the urge to urinate at least 5 minutes with a strong urge to urinate in order to make it to the bathroom without leaking.  Pt to report being able to transfer from sitting to standing without leakage of urine.  Pt to report a decrease in urinary frequency from every 2 hours to no more than once every 3 hours to improve ability to participate in social activities.  Pt to report being able to sneeze without incontinence demonstrating improved pelvic floor strength and coordination to improve confidence in social situations  Pt will return to biking without increased perineal pain. (Met)                Long Term Goals: 12 weeks  Pt to be discharged with home plan for carry over after discharge.    Pt to be able to perform a 10 second kegel x 10 reps to demonstrate improving strength and endurance needed for continence.  Pt to report a decrease in pad usage to 0-1 pads a day to demonstrate improving pelvic floor muscle controls as evidenced by decreased episodes of incontinence needed to improve confidence in social situations.  Pt to be able to delay the urge to urinate at least 10 minutes with a strong urge to urinate in order to make it to the bathroom without leaking.  Pt to report no longer feeling the need to urinate just in case when shopping or participating in social activities to demonstrate improving pelvic floor and bladder control.  Pt to report elimination of incontinence with ADLs 6/7 days a week to demonstrate improved pelvic floor muscle strength and coordination  Pt to demonstrate an improved score in the FOTO Urinary Problem survey  to at least 53% limitation to demonstrate improving CHLOE.    Pt to increase pelvic floor strength to at least 5/5 to demonstrate improved strength needed for continence with ADLs.         Plan     Plan of care Certification: 8/23/2023 to 11/30/2023.     Outpatient Physical Therapy 1 time every other week for 8 weeks to  include the following interventions: therapeutic exercises, therapeutic activity, neuromuscular re-education, manual therapy, patient/family education, and self care/home management    Rebeca Wagner, PT

## 2024-12-19 NOTE — TELEPHONE ENCOUNTER
Happy to see him, Sudhakar. Thank you.    Marichuy, please reach out and schedule him to see me at his earliest convenience.    -Sudhakar Willson MD [place], Santiago RIVERO MDYesterday (8:42 AM)       Good morning Dr. Chappell,    I'm sending a referral for CHLOE. Not a patient of mine - the dad of one of our pre-op nurses at [facility]. Prostatectomy 1.5 years ago with continuous leakage of urine. 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 I haven't seen him but told them I would send a referral.    Appreciate if you can help get him in for eval.    Thanks,  Kraig Vogel

## 2024-12-27 ENCOUNTER — OFFICE VISIT (OUTPATIENT)
Dept: UROLOGY | Facility: CLINIC | Age: 64
End: 2024-12-27
Payer: COMMERCIAL

## 2024-12-27 VITALS
DIASTOLIC BLOOD PRESSURE: 87 MMHG | WEIGHT: 178.13 LBS | SYSTOLIC BLOOD PRESSURE: 144 MMHG | HEART RATE: 62 BPM | BODY MASS INDEX: 25.93 KG/M2

## 2024-12-27 DIAGNOSIS — N52.9 ERECTILE DYSFUNCTION, UNSPECIFIED ERECTILE DYSFUNCTION TYPE: Primary | ICD-10-CM

## 2024-12-27 DIAGNOSIS — C61 PROSTATE CANCER: ICD-10-CM

## 2024-12-27 DIAGNOSIS — N39.3 SUI (STRESS URINARY INCONTINENCE), MALE: ICD-10-CM

## 2024-12-27 PROCEDURE — 99999 PR PBB SHADOW E&M-EST. PATIENT-LVL IV: CPT | Mod: PBBFAC,,, | Performed by: STUDENT IN AN ORGANIZED HEALTH CARE EDUCATION/TRAINING PROGRAM

## 2024-12-27 NOTE — PROGRESS NOTES
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     HPI:   It was great to see Abdi and his wife today.    Here for urinary incontinence. Started after RALP (bilateral nerve sparing) in 07/2023 at [facility]. Had GG3 disease, PSA undetectable since. No Rtx/ADT. Has had significant leakage since surgery, has leakage with standing, describes it as more constant that with only stress. Has tried PFPT (6mo), gemtesa, did not provide any relief. Is currently going through 12ppd.     No diabetes, last A1c 5.4, minimal smoking history. No planned future PCa interventions.     Is able to have erections, not sufficient for penetration, is not attempting due to urinary leakage.Is able to have orgasm.       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   He has a history of bilateral inguinal hernia repairs with mesh.  No history of diabetes. Takes not blood thinners other than a ppx 81mg ASA      Past Medical History:   Diagnosis Date    GERD (gastroesophageal reflux disease)     Hypertension       Past Surgical History:   Procedure Laterality Date    APPENDECTOMY      INGUINAL HERNIA REPAIR Bilateral      ROS: as per HPI    Social History     Tobacco Use   Smoking Status Never   Smokeless Tobacco Never        Physical Exam     General: No acute distress. Nontoxic appearing.  HENT: Normocephalic. Atraumatic.  Respiratory: Normal respiratory effort. No conversational dyspnea. No audible wheezing.  Abdomen: No obvious distension.  Skin: No visible abnormalities.  Extremities: No edema upper extremities. No edema lower extremities.  Neurological: Alert and oriented x3. Normal speech.  Psychiatric: Normal mood. Normal affect. No evidence of SI.     Data review  Prior internal/external notes have been reviewed: Yes  Care Everywhere reviewed for external records:  Yes    Pertinent labs and imaging independently reviewed include:   Lab Results   Component Value Date     08/12/2022    K 3.9 08/12/2022  "   CREATININE 1.0 08/12/2022    EGFRNORACEVR >60.0 08/12/2022     No results found for: "LABA1C", "HGBA1C"   No results found for: "PSA", "PSALABCORP"    PSA as per HPI    Problems addressed during today's encounter  Problem List Items Addressed This Visit          Renal/    CHLOE (stress urinary incontinence), male    Relevant Orders    Cystoscopy    Erectile dysfunction - Primary       Oncology    Prostate cancer        Plan for problems listed above includes:   Observation for Ed currently. Will address after incontinence is treated.   Continue PSA surveillance.  The patient has been counseled regarding options for male stress incontinence (including but not limited to external clamps, condom catheters, indwelling catheters, pads/diapers, intraurethral bulking agents, suburethral balloons, male sling, artificial urinary sphincter, and urinary diversion) and the/benefits of these options. Specifically, regarding placement of an Artifical Urinary Sphincter (AUS), it has been explained surgery carries risks of pain, bleeding, infection, scarring, altered sensation, injury to adjacent structures, device migration, difficulty cycling the device for proper use, patient dissatisfaction with cosmesis, retention of urine requiring catheterization and/or repeat surgery.  The patient has been counseled as the the need to avoid perineal pressure after placement, with examples of using bicycle seat and/or saddles with central cut-out. It has also been explained that urethral instrumentation is to be avoided after placement and that a urologist should be consulted for device activation before any consideration of catheter placement.  RTC for pre-operative cystoscopy.     Risk for nontreatment of mentioned condition(s) includes, but is not limited to:   Continuation or worsening of the current condition(s)    Patient risk factors for management (e.g., age, history, comorbidities) include:   Age, prior surgery    Treatment " requiring monitoring for toxicity includes:   None    Further evaluation ordered today:   None    Dictation is typically used for these notes, such that spelling/grammatical errors may be related to interpretation of spoken word.    Santiago Chappell MD

## 2025-01-10 ENCOUNTER — PROCEDURE VISIT (OUTPATIENT)
Facility: CLINIC | Age: 65
End: 2025-01-10
Payer: COMMERCIAL

## 2025-01-10 ENCOUNTER — PATIENT MESSAGE (OUTPATIENT)
Dept: UROLOGY | Facility: CLINIC | Age: 65
End: 2025-01-10
Payer: COMMERCIAL

## 2025-01-10 VITALS
TEMPERATURE: 97 F | RESPIRATION RATE: 17 BRPM | DIASTOLIC BLOOD PRESSURE: 81 MMHG | BODY MASS INDEX: 25.74 KG/M2 | WEIGHT: 176.81 LBS | HEART RATE: 67 BPM | SYSTOLIC BLOOD PRESSURE: 139 MMHG

## 2025-01-10 DIAGNOSIS — N39.3 SUI (STRESS URINARY INCONTINENCE), MALE: ICD-10-CM

## 2025-01-10 RX ORDER — SULFAMETHOXAZOLE AND TRIMETHOPRIM 800; 160 MG/1; MG/1
1 TABLET ORAL
Status: COMPLETED | OUTPATIENT
Start: 2025-01-10 | End: 2025-01-10

## 2025-01-10 RX ORDER — LIDOCAINE HYDROCHLORIDE 20 MG/ML
JELLY TOPICAL
Status: COMPLETED | OUTPATIENT
Start: 2025-01-10 | End: 2025-01-10

## 2025-01-10 RX ADMIN — LIDOCAINE HYDROCHLORIDE: 20 JELLY TOPICAL at 09:01

## 2025-01-10 RX ADMIN — SULFAMETHOXAZOLE AND TRIMETHOPRIM 1 TABLET: 800; 160 TABLET ORAL at 09:01

## 2025-01-10 NOTE — PROCEDURES
Cystoscopy    Date/Time: 1/10/2025 8:30 AM    Performed by: Santiago Chappell MD  Authorized by: Santiago Chappell MD    Consent Done?:  Yes (Written)  Local anesthesia used?: Yes    Local anesthetic:  Topical anesthetic    Risks and benefits of the procedure(s) were reviewed and written consent was provided by the patient.  The patient was prepped and positioned for the procedure.  Lidocaine infused lubricant was used for local anesthesia.    Procedure: Cystourethroscopy     Urethra: Normal in course and caliber   Prostate:  Absent  Bladder: Trigone distorted towards bladder neck. B/L ureteral orifices patent and effluxing  No stones, tumors or foreign bodies    No suspicious flat lesions of the mucosa  No trabeculation or diverticula    Patient tolerated the procedure(s) well.       Interpretation:  No VUAS or concerning lesions. OK to proceed to AUS.  The patient has been counseled regarding options for male stress incontinence (including but not limited to external clamps, condom catheters, indwelling catheters, pads/diapers, intraurethral bulking agents, suburethral balloons, male sling, artificial urinary sphincter, and urinary diversion) and the/benefits of these options. Specifically, regarding placement of an Artifical Urinary Sphincter (AUS), it has been explained surgery carries risks of pain, bleeding, infection, scarring, altered sensation, injury to adjacent structures, device migration, difficulty cycling the device for proper use, patient dissatisfaction with cosmesis, retention of urine requiring catheterization and/or repeat surgery.  The patient has been counseled as the the need to avoid perineal pressure after placement, with examples of using bicycle seat and/or saddles with central cut-out. It has also been explained that urethral instrumentation is to be avoided after placement and that a urologist should be consulted for device activation before any consideration of catheter  placement.  Specifically, we discussed that he will need to use a bike seat with a cut-out after AUS placement as he is an avid biker.   He will drop off a UA and we will obtain cardiac clearance from Dr. Rubio, his cardiologist.

## 2025-01-10 NOTE — PATIENT INSTRUCTIONS
What to Expect After a Cystoscopy  For the next 24-48 hours, you may feel a mild burning when you urinate. This burning is normal and expected. Drink plenty of water to dilute the urine to help relieve the burning sensation. You may also see a small amount of blood in your urine after the procedure.    Unless you are already taking antibiotics, you may be given an antibiotic after the test to prevent infection.    Signs and Symptoms to Report  Call the [facility] at [phone] if you develop any of the following:  Fever of 101 degrees or higher  Chills or persistent bleeding  Inability to urinate

## 2025-01-16 RX ORDER — ASPIRIN 81 MG/1
81 TABLET ORAL DAILY
COMMUNITY

## 2025-01-17 ENCOUNTER — TELEPHONE (OUTPATIENT)
Dept: UROLOGY | Facility: CLINIC | Age: 65
End: 2025-01-17
Payer: COMMERCIAL

## 2025-01-24 ENCOUNTER — TELEPHONE (OUTPATIENT)
Dept: UROLOGY | Facility: CLINIC | Age: 65
End: 2025-01-24
Payer: COMMERCIAL

## 2025-01-24 NOTE — TELEPHONE ENCOUNTER
----- Message from Aashish Mansfield sent at 1/24/2025  1:17 PM CST -----  Contact: pt @ [phone]    ----- Message -----  From: Marichuy Walker LPN  Sent: 1/24/2025  12:58 PM CST  To: Donal Schilling MA      ----- Message -----  From: Gisella Li  Sent: 1/24/2025  12:56 PM CST  To: Ta Henderson Staff    KIRA COFFMAN calling regarding Appointment Access  (message) for #pt is calling to get surgery reschedule 1/21, asking for call back

## 2025-02-21 ENCOUNTER — TELEPHONE (OUTPATIENT)
Dept: UROLOGY | Facility: CLINIC | Age: 65
End: 2025-02-21
Payer: COMMERCIAL

## 2025-02-21 NOTE — PRE-PROCEDURE INSTRUCTIONS
PreOp Instructions given:   - Verbal medication information (what to hold and what to take)   - NPO guidelines 2300  - Arrival place directions given; time to be given the day before procedure by the   Surgeon's Office DOSC  - Bathing with antibacterial soap   - Don't wear any jewelry or bring any valuables AM of surgery   - No makeup or moisturizer to face   - No perfume/cologne, powder, lotions or aftershave   Pt. verbalized understanding.   Pt denies any h/o Anesthesia/Sedation complications or side effects.  Patient does not know arrival time.  Explained that this information comes from the surgeon's office and if they haven't heard from them by 2 or 3 pm to call the office.  Patient stated an understanding.     In basket msg sent to Dr. Chappell/Staff re: Pt's request for a phone call to discuss upcoming procedure.

## 2025-02-21 NOTE — TELEPHONE ENCOUNTER
Spoke with patient, has a lot of questions in reference to p/o surgery, sent message for Dr Chappell to call him.

## 2025-02-24 ENCOUNTER — PATIENT MESSAGE (OUTPATIENT)
Dept: UROLOGY | Facility: CLINIC | Age: 65
End: 2025-02-24
Payer: COMMERCIAL

## 2025-02-24 ENCOUNTER — ANESTHESIA EVENT (OUTPATIENT)
Dept: SURGERY | Facility: HOSPITAL | Age: 65
DRG: 664 | End: 2025-02-24
Payer: MEDICARE

## 2025-02-24 ENCOUNTER — TELEPHONE (OUTPATIENT)
Dept: UROLOGY | Facility: CLINIC | Age: 65
End: 2025-02-24
Payer: COMMERCIAL

## 2025-02-24 NOTE — ANESTHESIA PREPROCEDURE EVALUATION
[facility]  Anesthesia Pre-Operative Evaluation         Patient Name: Anupam Suarez  YOB: 1960  MRN: [id number]    SUBJECTIVE:     Pre-operative evaluation for Procedure(s) (LRB):  INSERTION, ARTIFICIAL URINARY SPHINCTER (N/A)     02/24/2025    Anupam Suarez is a 64 y.o. male w/ a significant PMHx of HTN, hx SVT on Diltiazem, PreDM, anxiety, GERD, and stress urinary incontinence.    Patient now presents for the above procedure(s).      LDA: None documented.       Prev airway: None documented.    Drips: None documented.      Problem List[1]    Review of patient's allergies indicates:   Allergen Reactions    Ciprofloxacin Hives       Current Inpatient Medications:      Medications Ordered Prior to Encounter[2]    Past Surgical History:   Procedure Laterality Date    APPENDECTOMY      INGUINAL HERNIA REPAIR Bilateral        Social History[3]    OBJECTIVE:     Vital Signs Range (Last 24H):         Significant Labs:  Lab Results   Component Value Date    CHOL 123 08/12/2022    TRIG 71 08/12/2022    HDL 44 08/12/2022    ALT 28 08/12/2022    AST 23 08/12/2022     08/12/2022    K 3.9 08/12/2022     08/12/2022    CREATININE 1.0 08/12/2022    BUN 22 08/12/2022    CO2 27 08/12/2022    TSH 2.919 08/12/2022       Diagnostic Studies: No relevant studies.    EKG:   No results found for this or any previous visit.    2D ECHO:  TTE:  No results found for this or any previous visit.    RAOUL:  No results found for this or any previous visit.    ASSESSMENT/PLAN:           Pre-op Assessment    I have reviewed the Patient Summary Reports.     I have reviewed the Nursing Notes. I have reviewed the NPO Status.   I have reviewed the Medications.     Review of Systems  Anesthesia Hx:  No problems with previous Anesthesia   History of prior surgery of interest to airway management or planning:          Denies Family Hx of Anesthesia complications.    Denies Personal Hx of Anesthesia complications.                     Social:  Non-Smoker, No Alcohol Use       Hematology/Oncology:       -- Denies Anemia:                  Denies Current/Recent Cancer                Cardiovascular:     Hypertension    Denies CAD.    Dysrhythmias    Denies CHF.    no hyperlipidemia                               Pulmonary:    Denies COPD.  Denies Asthma.     Denies Sleep Apnea.                Renal/:   Denies Chronic Renal Disease.                Hepatic/GI:     GERD Denies Liver Disease.               Musculoskeletal:  Denies Arthritis.               Neurological:    Denies CVA. Denies Neuromuscular Disease.   Denies Seizures.          Denies Chronic Pain Syndrome                         Endocrine:  Denies Diabetes.   Denies Hyperthyroidism.       Denies Obesity / BMI > 30  Psych:   anxiety denies depression                Physical Exam  General: Well nourished, Cooperative, Alert and Oriented    Airway:  Mallampati: III / II  Mouth Opening: Normal  TM Distance: Normal  Tongue: Normal  Neck ROM: Normal ROM    Dental:  Intact        Anesthesia Plan  Type of Anesthesia, risks & benefits discussed:    Anesthesia Type: Gen ETT  Intra-op Monitoring Plan: Standard ASA Monitors  Post Op Pain Control Plan: multimodal analgesia and IV/PO Opioids PRN  Induction:  IV  Airway Plan: Direct and Video, Post-Induction  Informed Consent: Informed consent signed with the Patient and all parties understand the risks and agree with anesthesia plan.  All questions answered.   ASA Score: 2  Day of Surgery Review of History & Physical: H&P Update referred to the surgeon/provider.    Ready For Surgery From Anesthesia Perspective.     .           [1]   Patient Active Problem List  Diagnosis    Pelvic floor tension    CHLOE (stress urinary incontinence), male    Other lack of coordination    Prostate cancer    Erectile dysfunction   [2]   No current facility-administered medications on file prior to encounter.     Current Outpatient Medications on File Prior  to Encounter   Medication Sig Dispense Refill    alfuzosin (UROXATRAL) 10 mg Tb24 Take 10 mg by mouth daily with breakfast.      amlodipine (NORVASC) 10 MG tablet Take 10 mg by mouth once daily.      aspirin (ECOTRIN) 81 MG EC tablet Take 81 mg by mouth once daily.      cholecalciferol, vitamin D3, 125 mcg (5,000 unit) capsule Take 5,000 Units by mouth once daily.      clonazePAM (KLONOPIN) 0.5 MG tablet Take 0.5 mg by mouth 2 (two) times daily as needed for Anxiety.      cyanocobalamin 500 MCG tablet Take 500 mcg by mouth once daily.      diltiaZEM (DILACOR XR) 180 MG CDCR Take 180 mg by mouth once daily.      famotidine (PEPCID) 20 MG tablet Take 40 mg by mouth 2 (two) times daily.      hydrochlorothiazide (HYDRODIURIL) 25 MG tablet Take 25 mg by mouth once daily.      hydrocodone-acetaminophen 10-325mg (NORCO)  mg Tab Take 1 tablet by mouth every 4 (four) hours as needed for Pain. 18 tablet 0    ibuprofen (ADVIL,MOTRIN) 800 MG tablet Take 1 tablet (800 mg total) by mouth every 6 (six) hours as needed for Pain. 20 tablet 0    melatonin 5 mg Cap Take 1 capsule by mouth nightly as needed.      pantoprazole (PROTONIX) 20 MG tablet Take 20 mg by mouth once daily.      rosuvastatin (CRESTOR) 5 MG tablet Take 5 mg by mouth Every 3 (three) days.      traMADol (ULTRAM) 50 mg tablet Take 1 tablet (50 mg total) by mouth every 6 (six) hours as needed for Pain. 12 tablet 0    ZINC ACETATE ORAL Take 25 mg by mouth once daily at 6am.     [3]   Social History  Socioeconomic History    Marital status:    Tobacco Use    Smoking status: Never    Smokeless tobacco: Never   Substance and Sexual Activity    Alcohol use: Not Currently    Drug use: Never    Sexual activity: Yes     Partners: Male     Social Drivers of Health     Financial Resource Strain: Low Risk  (1/9/2025)    Overall Financial Resource Strain (CARDIA)     Difficulty of Paying Living Expenses: Not hard at all   Food Insecurity: No Food Insecurity  (1/9/2025)    Hunger Vital Sign     Worried About Running Out of Food in the Last Year: Never true     Ran Out of Food in the Last Year: Never true   Physical Activity: Insufficiently Active (1/9/2025)    Exercise Vital Sign     Days of Exercise per Week: 4 days     Minutes of Exercise per Session: 30 min   Stress: Stress Concern Present (1/9/2025)    [employer] [place] [employer] - Occupational Stress Questionnaire     Feeling of Stress : To some extent   Housing Stability: Unknown (1/9/2025)    Housing Stability Vital Sign     Unable to Pay for Housing in the Last Year: No

## 2025-02-24 NOTE — TELEPHONE ENCOUNTER
----- Message from Santiago Chappell MD sent at 2/24/2025  7:02 AM CST -----  Regarding: RE: Sx Tues 2/25  It's unfortunate that he waited to reach out until Friday afternoon right before his surgery. I didn't see this until now. Can you let him know that I will try to call him this evening if I have time after clinic? Otherwise I can discuss tomorrow when he arrives.  ----- Message -----  From: Marcela Rodgers MA  Sent: 2/21/2025   2:23 PM CST  To: Santiago Chappell MD  Subject: FW: Sx Tues 2/25                                 Spoke with patient, has several questions in reference to surgery, will he still be able to penile clamp after surgery, activity would he have to use cut out seat for for riding bikes indefinatly , Sabino you have problems with Erosion , and how do sex work after ? He would like to talk with you.  ----- Message -----  From: Donal Schilling MA  Sent: 2/21/2025   2:08 PM CST  To: Marcela Rodgers MA  Subject: FW: Sx [place] 2/25                                   ----- Message -----  From: Kimmy Oscar RN  Sent: 2/21/2025   1:57 PM CST  To: Santiago Chappell MD; Ta Henderson S#  Subject: Sx Tues 2/25                                     Pt is requesting a phone call to discuss upcoming procedure scheduled - Tuesday 2/25/2025.Thank you,-NAYAN Schultz, [phone]

## 2025-02-25 ENCOUNTER — HOSPITAL ENCOUNTER (INPATIENT)
Facility: HOSPITAL | Age: 65
LOS: 1 days | Discharge: HOME | DRG: 664 | End: 2025-02-26
Attending: STUDENT IN AN ORGANIZED HEALTH CARE EDUCATION/TRAINING PROGRAM | Admitting: STUDENT IN AN ORGANIZED HEALTH CARE EDUCATION/TRAINING PROGRAM
Payer: MEDICARE

## 2025-02-25 ENCOUNTER — ANESTHESIA (OUTPATIENT)
Dept: SURGERY | Facility: HOSPITAL | Age: 65
DRG: 664 | End: 2025-02-25
Payer: COMMERCIAL

## 2025-02-25 DIAGNOSIS — N39.3 SUI (STRESS URINARY INCONTINENCE), MALE: Primary | ICD-10-CM

## 2025-02-25 PROCEDURE — 0TJB8ZZ INSPECTION OF BLADDER, VIA NATURAL OR ARTIFICIAL OPENING ENDOSCOPIC: ICD-10-PCS | Performed by: STUDENT IN AN ORGANIZED HEALTH CARE EDUCATION/TRAINING PROGRAM

## 2025-02-25 PROCEDURE — C1889 IMPLANT/INSERT DEVICE, NOC: HCPCS | Performed by: STUDENT IN AN ORGANIZED HEALTH CARE EDUCATION/TRAINING PROGRAM

## 2025-02-25 PROCEDURE — 63600175 PHARM REV CODE 636 W HCPCS

## 2025-02-25 PROCEDURE — 71000033 HC RECOVERY, INTIAL HOUR: Performed by: STUDENT IN AN ORGANIZED HEALTH CARE EDUCATION/TRAINING PROGRAM

## 2025-02-25 PROCEDURE — 36000708 HC OR TIME LEV III 1ST 15 MIN: Performed by: STUDENT IN AN ORGANIZED HEALTH CARE EDUCATION/TRAINING PROGRAM

## 2025-02-25 PROCEDURE — 63600175 PHARM REV CODE 636 W HCPCS: Performed by: STUDENT IN AN ORGANIZED HEALTH CARE EDUCATION/TRAINING PROGRAM

## 2025-02-25 PROCEDURE — 37000008 HC ANESTHESIA 1ST 15 MINUTES: Performed by: STUDENT IN AN ORGANIZED HEALTH CARE EDUCATION/TRAINING PROGRAM

## 2025-02-25 PROCEDURE — 25000003 PHARM REV CODE 250: Performed by: STUDENT IN AN ORGANIZED HEALTH CARE EDUCATION/TRAINING PROGRAM

## 2025-02-25 PROCEDURE — 71000015 HC POSTOP RECOV 1ST HR: Performed by: STUDENT IN AN ORGANIZED HEALTH CARE EDUCATION/TRAINING PROGRAM

## 2025-02-25 PROCEDURE — 71000016 HC POSTOP RECOV ADDL HR: Performed by: STUDENT IN AN ORGANIZED HEALTH CARE EDUCATION/TRAINING PROGRAM

## 2025-02-25 PROCEDURE — 94761 N-INVAS EAR/PLS OXIMETRY MLT: CPT

## 2025-02-25 PROCEDURE — 25000003 PHARM REV CODE 250

## 2025-02-25 PROCEDURE — D9220A PRA ANESTHESIA: Mod: ,,, | Performed by: SURGERY

## 2025-02-25 PROCEDURE — 37000009 HC ANESTHESIA EA ADD 15 MINS: Performed by: STUDENT IN AN ORGANIZED HEALTH CARE EDUCATION/TRAINING PROGRAM

## 2025-02-25 PROCEDURE — 27201423 OPTIME MED/SURG SUP & DEVICES STERILE SUPPLY: Performed by: STUDENT IN AN ORGANIZED HEALTH CARE EDUCATION/TRAINING PROGRAM

## 2025-02-25 PROCEDURE — 0THD0LZ INSERTION OF ARTIFICIAL SPHINCTER INTO URETHRA, OPEN APPROACH: ICD-10-PCS | Performed by: STUDENT IN AN ORGANIZED HEALTH CARE EDUCATION/TRAINING PROGRAM

## 2025-02-25 PROCEDURE — 11000001 HC ACUTE MED/SURG PRIVATE ROOM

## 2025-02-25 PROCEDURE — 53445 INSERT URO/VES NCK SPHINCTER: CPT | Mod: ,,, | Performed by: STUDENT IN AN ORGANIZED HEALTH CARE EDUCATION/TRAINING PROGRAM

## 2025-02-25 PROCEDURE — 36000709 HC OR TIME LEV III EA ADD 15 MIN: Performed by: STUDENT IN AN ORGANIZED HEALTH CARE EDUCATION/TRAINING PROGRAM

## 2025-02-25 PROCEDURE — C1815 PROS, URINARY SPH, IMP: HCPCS | Performed by: STUDENT IN AN ORGANIZED HEALTH CARE EDUCATION/TRAINING PROGRAM

## 2025-02-25 DEVICE — ACCESSORY KIT QUICK CONNECT WINDOW CONNECTORS (3 STRAIGHT, 2 RIGHT ANGLE, 1 Y), SUTURE-TIE CONNECTORS (3 STRAIGHT, 2 RIGHT ANGLE, 1 Y), 8 COLLETS, 1 COLLET HOLDER, 1 CUFF SIZER, 2 22-GAUGE BLUNT TIP NEEDLES, 2 15-GAUGE BLUNT TIP NEEDLES, 2 30 CM LENGTHS OF TUBING
Type: IMPLANTABLE DEVICE | Site: URETHRA | Status: FUNCTIONAL
Brand: AMS 800 ARTIFICIAL URINARY SPHINCTER

## 2025-02-25 RX ORDER — PROMETHAZINE HYDROCHLORIDE 12.5 MG/1
25 TABLET ORAL EVERY 6 HOURS PRN
Status: DISCONTINUED | OUTPATIENT
Start: 2025-02-25 | End: 2025-02-26 | Stop reason: HOSPADM

## 2025-02-25 RX ORDER — PROPOFOL 10 MG/ML
VIAL (ML) INTRAVENOUS
Status: DISCONTINUED | OUTPATIENT
Start: 2025-02-25 | End: 2025-02-25

## 2025-02-25 RX ORDER — ACETAMINOPHEN 325 MG/1
650 TABLET ORAL EVERY 8 HOURS PRN
Status: DISCONTINUED | OUTPATIENT
Start: 2025-02-25 | End: 2025-02-26 | Stop reason: HOSPADM

## 2025-02-25 RX ORDER — MUPIROCIN 20 MG/G
1 OINTMENT TOPICAL DAILY
Status: DISCONTINUED | OUTPATIENT
Start: 2025-02-26 | End: 2025-02-26 | Stop reason: HOSPADM

## 2025-02-25 RX ORDER — DEXAMETHASONE SODIUM PHOSPHATE 4 MG/ML
INJECTION, SOLUTION INTRA-ARTICULAR; INTRALESIONAL; INTRAMUSCULAR; INTRAVENOUS; SOFT TISSUE
Status: DISCONTINUED | OUTPATIENT
Start: 2025-02-25 | End: 2025-02-25

## 2025-02-25 RX ORDER — LIDOCAINE HYDROCHLORIDE 20 MG/ML
INJECTION, SOLUTION EPIDURAL; INFILTRATION; INTRACAUDAL; PERINEURAL
Status: DISCONTINUED | OUTPATIENT
Start: 2025-02-25 | End: 2025-02-25

## 2025-02-25 RX ORDER — HYDROMORPHONE HYDROCHLORIDE 1 MG/ML
0.2 INJECTION, SOLUTION INTRAMUSCULAR; INTRAVENOUS; SUBCUTANEOUS EVERY 5 MIN PRN
Status: DISCONTINUED | OUTPATIENT
Start: 2025-02-25 | End: 2025-02-25 | Stop reason: HOSPADM

## 2025-02-25 RX ORDER — OXYCODONE HYDROCHLORIDE 5 MG/1
5 TABLET ORAL EVERY 4 HOURS PRN
Refills: 0 | Status: DISCONTINUED | OUTPATIENT
Start: 2025-02-25 | End: 2025-02-26 | Stop reason: HOSPADM

## 2025-02-25 RX ORDER — ONDANSETRON 8 MG/1
8 TABLET, ORALLY DISINTEGRATING ORAL EVERY 8 HOURS PRN
Status: DISCONTINUED | OUTPATIENT
Start: 2025-02-25 | End: 2025-02-26 | Stop reason: HOSPADM

## 2025-02-25 RX ORDER — HALOPERIDOL 5 MG/ML
0.5 INJECTION INTRAMUSCULAR EVERY 10 MIN PRN
Status: COMPLETED | OUTPATIENT
Start: 2025-02-25 | End: 2025-02-25

## 2025-02-25 RX ORDER — SODIUM CHLORIDE 0.9 % (FLUSH) 0.9 %
10 SYRINGE (ML) INJECTION
Status: DISCONTINUED | OUTPATIENT
Start: 2025-02-25 | End: 2025-02-25 | Stop reason: HOSPADM

## 2025-02-25 RX ORDER — ROCURONIUM BROMIDE 10 MG/ML
INJECTION, SOLUTION INTRAVENOUS
Status: DISCONTINUED | OUTPATIENT
Start: 2025-02-25 | End: 2025-02-25

## 2025-02-25 RX ORDER — FENTANYL CITRATE 50 UG/ML
INJECTION, SOLUTION INTRAMUSCULAR; INTRAVENOUS
Status: DISCONTINUED | OUTPATIENT
Start: 2025-02-25 | End: 2025-02-25

## 2025-02-25 RX ORDER — MELATONIN 1 MG/ML
5 LIQUID (ML) ORAL NIGHTLY PRN
Status: DISCONTINUED | OUTPATIENT
Start: 2025-02-25 | End: 2025-02-26 | Stop reason: HOSPADM

## 2025-02-25 RX ORDER — SODIUM CHLORIDE, SODIUM LACTATE, POTASSIUM CHLORIDE, CALCIUM CHLORIDE 600; 310; 30; 20 MG/100ML; MG/100ML; MG/100ML; MG/100ML
INJECTION, SOLUTION INTRAVENOUS CONTINUOUS
Status: ACTIVE | OUTPATIENT
Start: 2025-02-25 | End: 2025-02-25

## 2025-02-25 RX ORDER — ACETAMINOPHEN 500 MG
1000 TABLET ORAL
Status: DISCONTINUED | OUTPATIENT
Start: 2025-02-25 | End: 2025-02-25

## 2025-02-25 RX ORDER — ONDANSETRON HYDROCHLORIDE 2 MG/ML
INJECTION, SOLUTION INTRAVENOUS
Status: DISCONTINUED | OUTPATIENT
Start: 2025-02-25 | End: 2025-02-25

## 2025-02-25 RX ORDER — GLUCAGON 1 MG
1 KIT INJECTION
Status: DISCONTINUED | OUTPATIENT
Start: 2025-02-25 | End: 2025-02-25 | Stop reason: HOSPADM

## 2025-02-25 RX ORDER — PHENYLEPHRINE HCL IN 0.9% NACL 1 MG/10 ML
SYRINGE (ML) INTRAVENOUS
Status: DISCONTINUED | OUTPATIENT
Start: 2025-02-25 | End: 2025-02-25

## 2025-02-25 RX ORDER — DILTIAZEM HYDROCHLORIDE 180 MG/1
180 CAPSULE, COATED, EXTENDED RELEASE ORAL DAILY
Status: DISCONTINUED | OUTPATIENT
Start: 2025-02-26 | End: 2025-02-26 | Stop reason: HOSPADM

## 2025-02-25 RX ORDER — LIDOCAINE HYDROCHLORIDE 10 MG/ML
1 INJECTION, SOLUTION EPIDURAL; INFILTRATION; INTRACAUDAL; PERINEURAL ONCE AS NEEDED
Status: DISCONTINUED | OUTPATIENT
Start: 2025-02-25 | End: 2025-02-26 | Stop reason: HOSPADM

## 2025-02-25 RX ORDER — EPHEDRINE SULFATE 50 MG/ML
INJECTION, SOLUTION INTRAVENOUS
Status: DISCONTINUED | OUTPATIENT
Start: 2025-02-25 | End: 2025-02-25

## 2025-02-25 RX ORDER — TALC
6 POWDER (GRAM) TOPICAL NIGHTLY PRN
Status: DISCONTINUED | OUTPATIENT
Start: 2025-02-25 | End: 2025-02-26 | Stop reason: HOSPADM

## 2025-02-25 RX ORDER — DEXMEDETOMIDINE HYDROCHLORIDE 100 UG/ML
INJECTION, SOLUTION INTRAVENOUS
Status: DISCONTINUED | OUTPATIENT
Start: 2025-02-25 | End: 2025-02-25

## 2025-02-25 RX ORDER — FAMOTIDINE 20 MG/1
40 TABLET, FILM COATED ORAL 2 TIMES DAILY
Status: DISCONTINUED | OUTPATIENT
Start: 2025-02-25 | End: 2025-02-26 | Stop reason: HOSPADM

## 2025-02-25 RX ORDER — ACETAMINOPHEN 325 MG/1
650 TABLET ORAL EVERY 6 HOURS
Status: DISCONTINUED | OUTPATIENT
Start: 2025-02-25 | End: 2025-02-26 | Stop reason: HOSPADM

## 2025-02-25 RX ORDER — ACETAMINOPHEN 500 MG
1000 TABLET ORAL
Status: COMPLETED | OUTPATIENT
Start: 2025-02-25 | End: 2025-02-25

## 2025-02-25 RX ORDER — MIDAZOLAM HYDROCHLORIDE 1 MG/ML
INJECTION INTRAMUSCULAR; INTRAVENOUS
Status: DISCONTINUED | OUTPATIENT
Start: 2025-02-25 | End: 2025-02-25

## 2025-02-25 RX ADMIN — FENTANYL CITRATE 25 MCG: 50 INJECTION INTRAMUSCULAR; INTRAVENOUS at 09:02

## 2025-02-25 RX ADMIN — Medication 100 MCG: at 07:02

## 2025-02-25 RX ADMIN — EPHEDRINE SULFATE 5 MG: 50 INJECTION INTRAVENOUS at 08:02

## 2025-02-25 RX ADMIN — DEXMEDETOMIDINE 8 MCG: 200 INJECTION, SOLUTION INTRAVENOUS at 08:02

## 2025-02-25 RX ADMIN — EPHEDRINE SULFATE 5 MG: 50 INJECTION INTRAVENOUS at 09:02

## 2025-02-25 RX ADMIN — VANCOMYCIN HYDROCHLORIDE 1250 MG: 1.25 INJECTION, POWDER, LYOPHILIZED, FOR SOLUTION INTRAVENOUS at 03:02

## 2025-02-25 RX ADMIN — HALOPERIDOL LACTATE 0.5 MG: 5 INJECTION, SOLUTION INTRAMUSCULAR at 11:02

## 2025-02-25 RX ADMIN — ACETAMINOPHEN 650 MG: 325 TABLET ORAL at 05:02

## 2025-02-25 RX ADMIN — GENTAMICIN SULFATE 400 MG: 40 INJECTION, SOLUTION INTRAMUSCULAR; INTRAVENOUS at 06:02

## 2025-02-25 RX ADMIN — DEXAMETHASONE SODIUM PHOSPHATE 4 MG: 4 INJECTION INTRA-ARTICULAR; INTRALESIONAL; INTRAMUSCULAR; INTRAVENOUS; SOFT TISSUE at 07:02

## 2025-02-25 RX ADMIN — SUGAMMADEX 200 MG: 100 INJECTION, SOLUTION INTRAVENOUS at 10:02

## 2025-02-25 RX ADMIN — ROCURONIUM BROMIDE 50 MG: 10 INJECTION, SOLUTION INTRAVENOUS at 07:02

## 2025-02-25 RX ADMIN — GLYCOPYRROLATE 0.2 MG: 0.2 INJECTION INTRAMUSCULAR; INTRAVENOUS at 07:02

## 2025-02-25 RX ADMIN — VANCOMYCIN HYDROCHLORIDE 1250 MG: 1.25 INJECTION, POWDER, LYOPHILIZED, FOR SOLUTION INTRAVENOUS at 06:02

## 2025-02-25 RX ADMIN — ONDANSETRON 4 MG: 2 INJECTION INTRAMUSCULAR; INTRAVENOUS at 09:02

## 2025-02-25 RX ADMIN — SODIUM CHLORIDE, POTASSIUM CHLORIDE, SODIUM LACTATE AND CALCIUM CHLORIDE: 600; 310; 30; 20 INJECTION, SOLUTION INTRAVENOUS at 10:02

## 2025-02-25 RX ADMIN — ACETAMINOPHEN 650 MG: 325 TABLET ORAL at 12:02

## 2025-02-25 RX ADMIN — HALOPERIDOL LACTATE 0.5 MG: 5 INJECTION, SOLUTION INTRAMUSCULAR at 10:02

## 2025-02-25 RX ADMIN — ONDANSETRON 8 MG: 8 TABLET, ORALLY DISINTEGRATING ORAL at 04:02

## 2025-02-25 RX ADMIN — EPHEDRINE SULFATE 5 MG: 50 INJECTION INTRAVENOUS at 07:02

## 2025-02-25 RX ADMIN — SODIUM CHLORIDE, POTASSIUM CHLORIDE, SODIUM LACTATE AND CALCIUM CHLORIDE: 600; 310; 30; 20 INJECTION, SOLUTION INTRAVENOUS at 05:02

## 2025-02-25 RX ADMIN — DEXMEDETOMIDINE 8 MCG: 200 INJECTION, SOLUTION INTRAVENOUS at 09:02

## 2025-02-25 RX ADMIN — ACETAMINOPHEN 1000 MG: 500 TABLET ORAL at 06:02

## 2025-02-25 RX ADMIN — FENTANYL CITRATE 50 MCG: 50 INJECTION INTRAMUSCULAR; INTRAVENOUS at 09:02

## 2025-02-25 RX ADMIN — FAMOTIDINE 40 MG: 20 TABLET, FILM COATED ORAL at 09:02

## 2025-02-25 RX ADMIN — FENTANYL CITRATE 100 MCG: 50 INJECTION INTRAMUSCULAR; INTRAVENOUS at 07:02

## 2025-02-25 RX ADMIN — ROCURONIUM BROMIDE 30 MG: 10 INJECTION, SOLUTION INTRAVENOUS at 07:02

## 2025-02-25 RX ADMIN — DEXMEDETOMIDINE 4 MCG: 200 INJECTION, SOLUTION INTRAVENOUS at 09:02

## 2025-02-25 RX ADMIN — PROPOFOL 150 MG: 10 INJECTION, EMULSION INTRAVENOUS at 07:02

## 2025-02-25 RX ADMIN — SODIUM CHLORIDE: 0.9 INJECTION, SOLUTION INTRAVENOUS at 07:02

## 2025-02-25 RX ADMIN — OXYCODONE HYDROCHLORIDE 5 MG: 5 TABLET ORAL at 10:02

## 2025-02-25 RX ADMIN — ROCURONIUM BROMIDE 20 MG: 10 INJECTION, SOLUTION INTRAVENOUS at 08:02

## 2025-02-25 RX ADMIN — LIDOCAINE HYDROCHLORIDE 100 MG: 20 INJECTION, SOLUTION EPIDURAL; INFILTRATION; INTRACAUDAL at 07:02

## 2025-02-25 RX ADMIN — GENTAMICIN SULFATE 400 MG: 40 INJECTION, SOLUTION INTRAMUSCULAR; INTRAVENOUS at 07:02

## 2025-02-25 RX ADMIN — MIDAZOLAM 2 MG: 1 INJECTION INTRAMUSCULAR; INTRAVENOUS at 07:02

## 2025-02-25 NOTE — H&P
"Urology [facility]    HPI:  Anupam Suarez is a 64 y.o. male with history of incontinence following RALP in 2023. Presents for AUS insertion.      ROS:  Neg except per HPI    Past Medical History:   Diagnosis Date    GERD (gastroesophageal reflux disease)     Hypertension        Past Surgical History:   Procedure Laterality Date    APPENDECTOMY      INGUINAL HERNIA REPAIR Bilateral        Social History     Socioeconomic History    Marital status:    Tobacco Use    Smoking status: Never    Smokeless tobacco: Never   Substance and Sexual Activity    Alcohol use: Not Currently    Drug use: Never    Sexual activity: Yes     Partners: Male     Social Drivers of Health     Financial Resource Strain: Low Risk  (1/9/2025)    Overall Financial Resource Strain (CARDIA)     Difficulty of Paying Living Expenses: Not hard at all   Food Insecurity: No Food Insecurity (1/9/2025)    Hunger Vital Sign     Worried About Running Out of Food in the Last Year: Never true     Ran Out of Food in the Last Year: Never true   Physical Activity: Insufficiently Active (1/9/2025)    Exercise Vital Sign     Days of Exercise per Week: 4 days     Minutes of Exercise per Session: 30 min   Stress: Stress Concern Present (1/9/2025)    [employer] - Occupational Stress Questionnaire     Feeling of Stress : To some extent   Housing Stability: Unknown (1/9/2025)    Housing Stability Vital Sign     Unable to Pay for Housing in the Last Year: No       No family history on file.    Review of patient's allergies indicates:   Allergen Reactions    Ciprofloxacin Hives       Medications Ordered Prior to Encounter[1]    Anticoagulation:  No    Physical Exam:  Estimated body mass index is 25.74 kg/m² as calculated from the following:    Height as of this encounter: 5' 9.5" (1.765 m).    Weight as of this encounter: 80.2 kg (176 lb 12.9 oz).    General: No acute distress, well developed. AAOx3  Head: Normocephalic, " "Atraumatic  Eyes: Extra-occular movements intact, No discharge  Neck: supple, symmetrical, trachea midline  Lungs: normal respiratory effort, no respiratory distress, no wheezes  CV: regular rate, 2+ pulses  Abdomen: soft, non-tender, non-distended, no organomegaly  MSK: no edema, no deformities, normal ROM  Skin: skin color, texture, turgor normal.  Neurologic: no focal deficits, sensation intact    Labs:      No results found for: "WBC", "HGB", "HCT", "MCV", "PLT"        BMP  Lab Results   Component Value Date     08/12/2022    K 3.9 08/12/2022     08/12/2022    CO2 27 08/12/2022    BUN 22 08/12/2022    CREATININE 1.0 08/12/2022    CALCIUM 9.7 08/12/2022    ANIONGAP 9 08/12/2022    EGFRNORACEVR >60.0 08/12/2022         Assessment: Anupam Suarez is a 64 y.o. male with CHLOE following RALP in 2023.     Plan:     1. To OR for AUS insertion  2. Consents signed   3. I have explained the risk, benefits, and alternatives of the procedure in detail. The patient voices understanding and all questions have been answered. The patient agrees to proceed as planned.     Garry Sykes MD         [1]   No current facility-administered medications on file prior to encounter.     Current Outpatient Medications on File Prior to Encounter   Medication Sig Dispense Refill    alfuzosin (UROXATRAL) 10 mg Tb24 Take 10 mg by mouth daily with breakfast.      amlodipine (NORVASC) 10 MG tablet Take 10 mg by mouth once daily.      aspirin (ECOTRIN) 81 MG EC tablet Take 81 mg by mouth once daily.      cholecalciferol, vitamin D3, 125 mcg (5,000 unit) capsule Take 5,000 Units by mouth once daily.      clonazePAM (KLONOPIN) 0.5 MG tablet Take 0.5 mg by mouth 2 (two) times daily as needed for Anxiety.      cyanocobalamin 500 MCG tablet Take 500 mcg by mouth once daily.      diltiaZEM (DILACOR XR) 180 MG CDCR Take 180 mg by mouth once daily.      famotidine (PEPCID) 20 MG tablet Take 40 mg by mouth 2 (two) times daily.      " hydrochlorothiazide (HYDRODIURIL) 25 MG tablet Take 25 mg by mouth once daily.      hydrocodone-acetaminophen 10-325mg (NORCO)  mg Tab Take 1 tablet by mouth every 4 (four) hours as needed for Pain. 18 tablet 0    ibuprofen (ADVIL,MOTRIN) 800 MG tablet Take 1 tablet (800 mg total) by mouth every 6 (six) hours as needed for Pain. 20 tablet 0    melatonin 5 mg Cap Take 1 capsule by mouth nightly as needed.      pantoprazole (PROTONIX) 20 MG tablet Take 20 mg by mouth once daily.      rosuvastatin (CRESTOR) 5 MG tablet Take 5 mg by mouth Every 3 (three) days.      traMADol (ULTRAM) 50 mg tablet Take 1 tablet (50 mg total) by mouth every 6 (six) hours as needed for Pain. 12 tablet 0    ZINC ACETATE ORAL Take 25 mg by mouth once daily at 6am.

## 2025-02-25 NOTE — PLAN OF CARE
Pt IV flushed. LR @ 125 infusing. Pt reports mild incisional pain, oral meds given. Perineum and suprapubic incision dressings CDI, mesh underwear intact. Thompson in place and draining

## 2025-02-25 NOTE — ANESTHESIA PROCEDURE NOTES
Intubation    Date/Time: 2/25/2025 7:15 AM    Performed by: Niall Downs MD  Authorized by: Niall Sun MD    Intubation:     Induction:  Intravenous    Intubated:  Postinduction    Mask Ventilation:  Easy with oral airway    Attempts:  1    Attempted By:  Resident anesthesiologist    Method of Intubation:  Video laryngoscopy    Blade:  Ken 3    Laryngeal View Grade: Grade I - full view of cords      Difficult Airway Encountered?: No      Complications:  None    Airway Device:  Oral endotracheal tube    Airway Device Size:  7.5    Style/Cuff Inflation:  Cuffed (inflated to minimal occlusive pressure)    Tube secured:  22    Secured at:  The teeth    Placement Verified By:  Capnometry    Complicating Factors:  None    Findings Post-Intubation:  BS equal bilateral and atraumatic/condition of teeth unchanged

## 2025-02-25 NOTE — BRIEF OP NOTE
[facility] - Surgery ([facility])  Brief Operative Note    SUMMARY     Surgery Date: 2/25/2025     Surgeons and Role:     * Santiago Chappell MD - Primary     * Williams Granado MD - Resident - Assisting     * Garry Sykes MD - Resident - Assisting        Pre-op Diagnosis:  CHLOE (stress urinary incontinence), male [N39.3]    Post-op Diagnosis:  Post-Op Diagnosis Codes:     * CHLOE (stress urinary incontinence), male [N39.3]    Procedure(s) (LRB):  INSERTION, ARTIFICIAL URINARY SPHINCTER (N/A)  CYSTOSCOPY, FLEXIBLE    Anesthesia: General    Implants:  Implant Name Type Inv. Item Serial No.  Lot No. LRB No. Used Action   KIT  ACCESSORY - [id number]  KIT  ACCESSORY  [place] [id number] N/A 1 Implanted   BLLN REGULATING PRESSURE - [id number]  BLLN REGULATING PRESSURE   [id number] N/A 1 Implanted   CUFF OCCL BELT 4CM INHIBIZONE - [id number]  CUFF OCCL BELT 4CM INHIBIZONE   [id number] N/A 1 Implanted   PUMP CTRL PENL PROS INHIBIZONE - [id number]  PUMP CTRL PENL PROS INHIBIZONE   [id number] N/A 1 Implanted       Operative Findings: 4cm cuff placed. 12fr byers in place until tomorrow morning.    Estimated Blood Loss: 50cc    Estimated Blood Loss has not been documented. EBL = 50cc.         Specimens:   Specimen (24h ago, onward)      None            [id number]

## 2025-02-25 NOTE — OP NOTE
DATE OF PROCEDURE:   2/25/2025    PRE-OP DIAGNOSES:   History of prostate cancer.  Male stress urinary incontinence.     POST-OP DIAGNOSES:   As above    PROCEDURES:  Cystoscopy.  Insertion of artificial urinary sphincter.    SURGEONS:   Surgeons and Role:     * Santiago Chappell MD - Primary     * Williams Granado MD - Resident - Assisting     * Garry Sykes MD - Resident - Assisting    ANESTHESIOLOGY:   Anesthesiologist: Niall Sun MD  Anesthesia Resident: Niall Downs MD    STAFF:   Circulator: Alyson Rod RN  Relief Circulator: Heather Appiah RN  Relief Scrub: Fausto Villa ST  Scrub Person: Isacpaulinalandy ClaudiaST    ANESTHESIA TYPE:  General anesthesia    ESTIMATED BLOOD LOSS:   50cc    COMPLICATIONS:   None    DRAINS:   12fr urethral Thompson catheter      FINDINGS:  Good urethral coaptation with AUS    Indications and History:   This patient has a history of treatment for prostate cancer and male stress urinary incontinence refractory to medical management who presents today for elective insertion of an artificial sphincter.    All risks, benefits, alternatives and contraindications to the procedure were explained and a preoperative evaluation was performed. The patient received preoperative intravenous antibiotics and signed a legal written consent prior to proceeding.    PROCEDURE SUMMARY  After informed consent was obtained, patient was taken to the operating room.  They were placed under general anesthesia, prepped and draped in the normal standard fashion in the dorsal lithotomy position. Of note, a 10-minute scrub was used on the skin after he had been shaved. An additional chlorhexidine prep was utilized. He was then draped in our standard fashion. Care was taken to pad all bony prominences appropriately and spontaneous compression devices were placed. A surgical time-out was performed which confirmed patient and procedure, laterality and pre-operative antibiotics.     An indwelling  14 [place]ese urethral Thompson catheter was placed and the balloon was inflated. A vertical midline incision was made on the patient's perineal rhaphe and dissection carried down to the bulbar urethra. During dissection of the bulbar urethra, a small ventral corporotomy was made, which was repaired with a running 3-0 vicryl suture. The proximal bulb was circumferentially mobilized and a measuring tape found this to measure approximately 4 cm in circumference. As such, a 4 cm cuff for an artificial urinary sphincter was selected. This was prepared on the back table and then placed around the urethra. A counterincision was made in the right infrainguinal region. This was an oblique 2 cm incision over the area of the palpable pubic tubercle.    Dissection was carried down through the fascia and the fascia above the pubic tubercle was perforated with a pair of Metzenbaum scissors and the retropubic space was developed to allow placement of the 61-70 cm of water pressure regulating balloon. Upon placement of the PRB, 24 ml of sterile fluid was placed within. The sphincter cuff was then placed around the urethra and the tubing from the cuff was then passed from the perineum through the subcutaneous tissues and out through the superior incision with the assistance of a tubing passer. Copious antibiotic irrigation was used throughout the case. An artificial sphincter pump was then placed in a subdartos pouch in the right lateral scrotum which had been created with blunt dissection.     All appropriate segments of tubing were flushed and connected with the accessory kit. The indwelling urethral catheter was removed and flexible cystoscopy was performed. The cuff was noted to be in good location and the integrity of the urethral lumen was intact. The cuff was cycled and noted to have excellent coaptation and then was left in a deactivated position. The scope was then removed and with the cuff deactivated, a new 12-Sudanese urethral  Thompson catheter was placed. The balloon was inflated and this was left to drainage. Of note, the bladder had been drained throughout the case, especially upon placement of the pressure regulating balloon.     The perineal incision was again irrigated. The overlying tissue was closed in three layers of absorbable suture. The infrainguinal incision was closed in similar fashion. The patient was cleansed and dried, Dermabond applied, and a dressing was placed.    He was sent to recovery in stable condition. All sponge, needle and instrument counts were noted to be correct and I was present and participating throughout the entire procedure. The patient will be kept for overnight observation. It was a pleasure participating in his  care today.      Plan:  Admit for overnight observation.   [place] of void in the morning prior to discharge.  Return to clinic for device activation in 6 weeks.    Attestation:  I was present and scrubbed in for the entire procedure.     SPECIMENS:   Specimens (From admission, onward)      None             IMPLANTS:  Implant Name Type Inv. Item Serial No.  Lot No. LRB No. Used Action   KIT  ACCESSORY - [id number]  KIT  ACCESSORY  [employer] [employer] [id number] N/A 1 Implanted   BLLN REGULATING PRESSURE - [id number]  BLLN REGULATING PRESSURE   [id number] N/A 1 Implanted   CUFF OCCL BELT 4CM INHIBIZONE - [id number]  CUFF OCCL BELT 4CM INHIBIZONE   [id number] N/A 1 Implanted   PUMP CTRL PENL PROS INHIBIZONE - [id number]  PUMP CTRL PENL PROS INHIBIZONE   [id number] N/A 1 Implanted

## 2025-02-25 NOTE — NURSING TRANSFER
Nursing Transfer Note      2/25/2025   10:56 AM    Nurse giving handoff:Jameson RIVERO RN  Nurse receiving handoff:Jennifer SANCHEZ    Reason patient is being transferred: meets criteria    Transfer To: 523    Transfer via bed    Transfer with none    Transported by transport x2    Transfer Vital Signs:  Blood Pressure:97/56  Heart Rate:62  O2:97  Temperature:98.2  Respirations:13    Telemetry: Rhythm sr  Order for Tele Monitor? No    Additional Lines: Thompson Catheter    Medicines sent: LR @ 125    Any special needs or follow-up needed: none    Patient belongings transferred with patient: No    Chart send with patient: Yes    Notified: spouse    Patient reassessed at: 2/25

## 2025-02-25 NOTE — TRANSFER OF CARE
"Anesthesia Transfer of Care Note    Patient: Anupam Suarez    Procedure(s) Performed: Procedure(s) (LRB):  INSERTION, ARTIFICIAL URINARY SPHINCTER (N/A)  CYSTOSCOPY, FLEXIBLE    Patient location: PACU    Anesthesia Type: general    Transport from OR: Transported from OR on 6-10 L/min O2 by face mask with adequate spontaneous ventilation    Post pain: adequate analgesia    Post assessment: no apparent anesthetic complications    Post vital signs: stable    Level of consciousness: awake and alert    Nausea/Vomiting: no nausea/vomiting    Complications: none    Transfer of care protocol was followed      Last vitals: Visit Vitals  BP (!) 155/68   Pulse 76   Temp 36.6 °C (97.9 °F) (Temporal)   Resp 18   Ht 5' 9.5" (1.765 m)   Wt 80.2 kg (176 lb 12.9 oz)   SpO2 100%   BMI 25.74 kg/m²     "

## 2025-02-26 VITALS
HEIGHT: 70 IN | OXYGEN SATURATION: 97 % | BODY MASS INDEX: 25.31 KG/M2 | SYSTOLIC BLOOD PRESSURE: 140 MMHG | HEART RATE: 70 BPM | TEMPERATURE: 96 F | WEIGHT: 176.81 LBS | RESPIRATION RATE: 18 BRPM | DIASTOLIC BLOOD PRESSURE: 75 MMHG

## 2025-02-26 PROCEDURE — 25000003 PHARM REV CODE 250: Performed by: STUDENT IN AN ORGANIZED HEALTH CARE EDUCATION/TRAINING PROGRAM

## 2025-02-26 PROCEDURE — 99223 1ST HOSP IP/OBS HIGH 75: CPT | Mod: ,,, | Performed by: STUDENT IN AN ORGANIZED HEALTH CARE EDUCATION/TRAINING PROGRAM

## 2025-02-26 PROCEDURE — 63600175 PHARM REV CODE 636 W HCPCS: Performed by: STUDENT IN AN ORGANIZED HEALTH CARE EDUCATION/TRAINING PROGRAM

## 2025-02-26 PROCEDURE — 94761 N-INVAS EAR/PLS OXIMETRY MLT: CPT

## 2025-02-26 RX ORDER — TADALAFIL 20 MG/1
20 TABLET ORAL DAILY PRN
Qty: 20 TABLET | Refills: 11 | Status: SHIPPED | OUTPATIENT
Start: 2025-02-26 | End: 2025-02-26

## 2025-02-26 RX ORDER — TADALAFIL 5 MG/1
5 TABLET ORAL DAILY
Qty: 90 TABLET | Refills: 3 | Status: SHIPPED | OUTPATIENT
Start: 2025-02-26

## 2025-02-26 RX ORDER — ACETAMINOPHEN 325 MG/1
650 TABLET ORAL EVERY 6 HOURS
Qty: 40 TABLET | Refills: 0 | Status: SHIPPED | OUTPATIENT
Start: 2025-02-26 | End: 2025-03-03

## 2025-02-26 RX ORDER — SULFAMETHOXAZOLE AND TRIMETHOPRIM 400; 80 MG/1; MG/1
1 TABLET ORAL DAILY
Qty: 5 TABLET | Refills: 0 | Status: SHIPPED | OUTPATIENT
Start: 2025-02-26 | End: 2025-03-03

## 2025-02-26 RX ORDER — HYDROCODONE BITARTRATE AND ACETAMINOPHEN 5; 325 MG/1; MG/1
1 TABLET ORAL EVERY 6 HOURS PRN
Qty: 10 TABLET | Refills: 0 | Status: SHIPPED | OUTPATIENT
Start: 2025-02-26

## 2025-02-26 RX ORDER — POLYETHYLENE GLYCOL 3350 17 G/17G
17 POWDER, FOR SOLUTION ORAL DAILY
Qty: 238 G | Refills: 0 | Status: SHIPPED | OUTPATIENT
Start: 2025-02-26 | End: 2025-03-12

## 2025-02-26 RX ADMIN — ACETAMINOPHEN 650 MG: 325 TABLET ORAL at 12:02

## 2025-02-26 RX ADMIN — MUPIROCIN 1 G: 20 OINTMENT TOPICAL at 08:02

## 2025-02-26 RX ADMIN — VANCOMYCIN HYDROCHLORIDE 1250 MG: 1.25 INJECTION, POWDER, LYOPHILIZED, FOR SOLUTION INTRAVENOUS at 04:02

## 2025-02-26 RX ADMIN — GENTAMICIN SULFATE 225.6 MG: 40 INJECTION, SOLUTION INTRAMUSCULAR; INTRAVENOUS at 05:02

## 2025-02-26 RX ADMIN — ACETAMINOPHEN 650 MG: 325 TABLET ORAL at 05:02

## 2025-02-26 RX ADMIN — DILTIAZEM HYDROCHLORIDE 180 MG: 180 CAPSULE, COATED, EXTENDED RELEASE ORAL at 08:02

## 2025-02-26 RX ADMIN — FAMOTIDINE 40 MG: 20 TABLET, FILM COATED ORAL at 08:02

## 2025-02-26 NOTE — PROGRESS NOTES
Anupam Suarez's  surgery  and associated clinical symptoms improved after monitoring of vital signs and advancing diet in the time period spanning less than 2 midnights. This was an unexpected, rapid recovery. He was able to be discharged home to follow up with his PCP and  Dr. Chappell.    Garry Sykes MD  Urology PGY-3  [facility]

## 2025-02-26 NOTE — ASSESSMENT & PLAN NOTE
Anupam Suarez is a 64 y.o. M with a past medical history of stress urinary incontinence. He is now s/p insertion of artificial urinary sphincter with Dr. Chappell on 2/25/25.      -- MMPC  -- Home meds restarted  -- Thompson removed this AM; will attempt voiding trial  -- Encourage ambulation  -- Diet: Regular diet  -- Continue anti-nausea regimen PRN    Dispo: Will likely discharge home today pending ambulation, voiding, and tolerating diet

## 2025-02-26 NOTE — PROGRESS NOTES
Anupam Suarez's  surgery  and associated clinical symptoms improved after monitoring of vital signs and advancing diet in the time period spanning less than 2 midnights. This was an unexpected, rapid recovery. He was able to be discharged home to follow up with his PCP.

## 2025-02-26 NOTE — PLAN OF CARE
[facility] - Surgery  Discharge Final Note    Primary Care Provider: No, Primary Doctor    Expected Discharge Date: 2/26/2025    Final Discharge Note (most recent)       Final Note - 02/26/25 1350          Final Note    Assessment Type Final Discharge Note     Anticipated Discharge Disposition Home or Self Care     What phone number can be called within the next 1-3 days to see how you are doing after discharge? --   [phone]                    Important Message from Medicare           Future Appointments   Date Time Provider Department Center   3/14/2025  9:30 AM Santiago Chappell MD [facility] [place]   3/14/2025 10:00 AM Marli Otero MD [facility] [place]   4/11/2025  1:30 PM Santiago Chappell MD [facility] [place]     Patient discharged home to care of family on 2/26/25.    Natalya Espinosa RNCM  Case Management  [facility]  [phone]

## 2025-02-26 NOTE — PLAN OF CARE
[facility] - Surgery  Initial Discharge Assessment       Primary Care Provider: Nova, Primary Doctor    Admission Diagnosis: CHLOE (stress urinary incontinence), male [N39.3]    Admission Date: 2/25/2025  Expected Discharge Date: 2/26/2025    Transition of Care Barriers: None    Payor: MEDICARE / Plan: MEDICARE A ONLY / Product Type: Government /     Extended Emergency Contact Information  Primary Emergency Contact: Inocencia Suarez  Mobile Phone: [phone]  Relation: Spouse    Discharge Plan A: Home with family  Discharge Plan B: Home with family, Home Health      [id number] - [place], [place] - [address] AT [facility]  [address]  [facility] [address]  Phone: [phone] Fax: [phone]    [facility] [address] [place]  [address] [facility] [address]  Phone: [phone] Fax: [phone]    [facility] [place] [address] [place]  [address] [place]  [place] [address]  Phone: [phone] Fax: [phone]      Initial Assessment (most recent)       Adult Discharge Assessment - 02/26/25 1136          Discharge Assessment    Assessment Type Discharge Planning Assessment     Confirmed/corrected address, phone number and insurance Yes     Confirmed Demographics Correct on Facesheet     Source of Information patient;family     People in Home spouse     Do you expect to return to your current living situation? Yes     Do you have help at home or someone to help you manage your care at home? Yes     Who are your caregiver(s) and their phone number(s)? Spouse (Inocencia)     Prior to hospitilization cognitive status: Alert/Oriented     Current cognitive status: Alert/Oriented     Walking or Climbing Stairs Difficulty no     Dressing/Bathing Difficulty no     Home Accessibility wheelchair accessible     Home Layout Able to live on 1st floor     Equipment Currently Used at Home none     Patient  currently being followed by outpatient case management? No     Do you currently have service(s) that help you manage your care at home? No     Do you take prescription medications? No     Do you have prescription coverage? Yes     Do you have any problems affording any of your prescribed medications? No     Is the patient taking medications as prescribed? yes     How do you get to doctors appointments? car, drives self;family or friend will provide     Are you on dialysis? No     Do you take coumadin? No     Discharge Plan A Home with family     Discharge Plan B Home with family;Home Health     DME Needed Upon Discharge  none     Discharge Plan discussed with: Patient     Transition of Care Barriers None                       SW completed discharge planning assessment with the patient and pt's spouse (Inocencia) at bedside. SW verified demographic information listed on the pt.'s Face sheet. Pt reports living with his spouse in a two story home, master located on the 1st floor. Pt's wife plans to help manage the pts care and transportation upon d/c.Patient doesn't report utilizing any equipment prior to this hospital stay, nor reports any DME needs upon discharge at this time.     Discharge Plan A and Plan B have been determined by review of patient's clinical status, future medical and therapeutic needs, and coverage/benefits for post-acute care in coordination with multidisciplinary team members.      Gayathri Bermudez LCSW  Case Management   [facility]   Ext. [phone]

## 2025-02-26 NOTE — DISCHARGE SUMMARY
[facility]  Urology  Discharge Summary      Patient Name: Anupam Coffman  MRN: [id number]  Admission Date: 2/25/2025  Hospital Length of Stay: 1 days  Discharge Date and Time:  02/26/2025 7:59 AM  Attending Physician: Santiago Chappell MD   Discharging Provider: Aki Pierce MD  Primary Care Provider: Nova, Primary Doctor     HPI: It was great to see Michael and his wife today.     Here for urinary incontinence. Started after RALP (bilateral nerve sparing) in 07/2023 at [facility]. Had GG3 disease, PSA undetectable since. No Rtx/ADT. Has had significant leakage since surgery, has leakage with standing, describes it as more constant that with only stress. Has tried PFPT (6mo), gemtesa, did not provide any relief. Is currently going through 12ppd.      No diabetes, last A1c 5.4, minimal smoking history. No planned future PCa interventions.      Is able to have erections, not sufficient for penetration, is not attempting due to urinary leakage.Is able to have orgasm.        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   He has a history of bilateral inguinal hernia repairs with mesh.  No history of diabetes. Takes not blood thinners other than a ppx 81mg ASA    Procedure(s) (LRB):  INSERTION, ARTIFICIAL URINARY SPHINCTER (N/A)  CYSTOSCOPY, FLEXIBLE     Hospital Course:   Patient was admitted to the urology service. he was made NPO, given IVF, as well as pain and nausea control. Started on antibiotics. MICHAEL COFFMAN 64 y.o.male underwent: Procedure(s) (LRB):  INSERTION, ARTIFICIAL URINARY SPHINCTER (N/A)  CYSTOSCOPY, FLEXIBLE. The patient tolerated the procedure well, was transferred to recovery post-op, and then transferred to the floor for continuation of medical care. The patient's clinical condition progressively improved. Had ROBF. Leukocytosis improved. Patient was HDS throughout admission. By the time of discharge, he was meeting all post op milestones, tolerating a diet without nausea or vomiting, pain was well  controlled with oral medications, and he was ambulating without difficulty. Voiding appropriately. On POD 1 the patient was discharged to home. On discharge, the patient's incisions were c/d/i and the surgical site was soft and appropriately tender to palpation. The patient will follow up in urology clinic in 2 weeks. Discussed POC and ED precautions with patient. Patient verbalized understanding and is agreeable to plan. All questions answered.    Please see hospital and op notes for further detail regarding patient's admission.    Patient's discharge was discussed with Dr. Chappell.         Indwelling Lines/Drains at time of discharge:   Lines/Drains/Airways       Drain  Duration                  Urethral Catheter 02/25/25 Non-latex 14 Fr. 1 day                    Significant Diagnostic Studies: Labs: All labs within the past 24 hours have been reviewed    Pending Diagnostic Studies:       None            Final Active Diagnoses:    Diagnosis Date Noted POA    PRINCIPAL PROBLEM:  CHLOE (stress urinary incontinence), male [N39.3] 08/23/2023 Yes      Problems Resolved During this Admission:        Discharged Condition: good    Disposition: Home or Self Care    Follow Up:      Patient Instructions:      Diet Adult Regular     Notify your health care provider if you experience any of the following:  temperature >100.4     Notify your health care provider if you experience any of the following:  persistent nausea and vomiting or diarrhea     Notify your health care provider if you experience any of the following:  severe uncontrolled pain     Notify your health care provider if you experience any of the following:  redness, tenderness, or signs of infection (pain, swelling, redness, odor or green/yellow discharge around incision site)     Activity as tolerated       Medications:  Reconciled Home Medications:      Medication List        START taking these medications      acetaminophen 325 MG tablet  Commonly known as:  TYLENOL  Take 2 tablets (650 mg total) by mouth every 6 (six) hours. for 5 days     polyethylene glycol 17 gram Pwpk  Commonly known as: GLYCOLAX  Take 17 g by mouth once daily. for 14 days     sulfamethoxazole-trimethoprim 400-80mg 400-80 mg per tablet  Commonly known as: BACTRIM  Take 1 tablet by mouth once daily. Please take once daily until catheter is removed. for 5 days     tadalafiL 5 MG tablet  Commonly known as: CIALIS  Take 1 tablet (5 mg total) by mouth once daily. Take 1 hour before intercourse            CONTINUE taking these medications      aspirin 81 MG EC tablet  Commonly known as: ECOTRIN  Take 81 mg by mouth once daily.     clonazePAM 0.5 MG tablet  Commonly known as: KlonoPIN  Take 0.5 mg by mouth 2 (two) times daily as needed for Anxiety.     diltiaZEM 180 MG Cdcr  Commonly known as: DILACOR XR  Take 180 mg by mouth once daily.     famotidine 20 MG tablet  Commonly known as: PEPCID  Take 40 mg by mouth 2 (two) times daily.     melatonin 5 mg Cap  Take 1 capsule by mouth nightly as needed.     ZINC ACETATE ORAL  Take 25 mg by mouth once daily at 6am.            ASK your doctor about these medications      alfuzosin 10 mg Tb24  Commonly known as: UROXATRAL  Take 10 mg by mouth daily with breakfast.     amLODIPine 10 MG tablet  Commonly known as: NORVASC  Take 10 mg by mouth once daily.     cholecalciferol (vitamin D3) 125 mcg (5,000 unit) capsule  Take 5,000 Units by mouth once daily.     cyanocobalamin 500 MCG tablet  Take 500 mcg by mouth once daily.     hydroCHLOROthiazide 25 MG tablet  Commonly known as: HYDRODIURIL  Take 25 mg by mouth once daily.     HYDROcodone-acetaminophen  mg per tablet  Commonly known as: NORCO  Take 1 tablet by mouth every 4 (four) hours as needed for Pain.     ibuprofen 800 MG tablet  Commonly known as: ADVIL,MOTRIN  Take 1 tablet (800 mg total) by mouth every 6 (six) hours as needed for Pain.     pantoprazole 20 MG tablet  Commonly known as: PROTONIX  Take 20  mg by mouth once daily.     rosuvastatin 5 MG tablet  Commonly known as: CRESTOR  Take 5 mg by mouth Every 3 (three) days.     traMADoL 50 mg tablet  Commonly known as: ULTRAM  Take 1 tablet (50 mg total) by mouth every 6 (six) hours as needed for Pain.              Aki Pierce MD           Patient was seen and examined on the date of discharge and determined to be suitable for discharge.  Total time spent preparing discharge services: 20 minutes.  Time was spent speaking with consultants and case management, reviewing records, and/or discussing the plan of care with patient/family.    Aki Pierce, DO  Urology   PGY-1

## 2025-02-26 NOTE — DISCHARGE INSTRUCTIONS
"Discharge Instructions After Abdominal Operation     Pain Control: It is expected to have pain after surgery, but this should improve over the next several weeks. You may be prescribed a narcotic pain medication and/or a muscle relaxer on discharge. You can take this medication as prescribed if the pain is severe but try to decrease the frequency at which you use the narcotic over the initial two (2) weeks.  You may find you need less narcotic pain medication if you combine or stagger it with Tylenol® (acetaminophen) and/or Advil® (ibuprofen). For example, you may take Tylenol 1000mg, then take Advil 600mg 3 hours later, then repeat Tylenol 3 hours after the Advil, and so on.  You should not take more than 4000 mg of Tylenol® or 3200 mg of Advil® in a 24-hour period.  Narcotics and muscle relaxers can cause drowsiness, so sometimes it is helpful to take before bed for a more comfortable rest, but you CANNOT drive, operate machinery, or do mentally important work while on this medication.  Narcotics cause painful constipation - see below on how to prevent this or what to take if this happens.  This is unlikely to be your case.    Medications:  Okay to restart your other home meds.    Wound Care: The incisions can be left open to air unless there is drainage, in which case you should cover the wound with a dry, clean bandage or piece of gauze. Change the dressing 1-2 times each day as needed to maintain a relatively dry dressing. You may have "skin glue" covering your incisions which will peel/crumble off on its own over the next week or two. If you have staples in place, staples are removed in 2-3 weeks by a nurse or physician.  You should shower every day without a dressing on the incisions and let the water run over the incisions. Do not soak in a bathtub, hot tub or pool until the incisions are completely healed, which usually takes ~4-6 weeks.    Bowel Function: Diarrhea and loose stool are normal and expected " after intestinal surgery. Bowel function initially tends to be erratic (increased frequency, gas, liquid/loose consistency, seepage, urgency), but it will improve over the next several months as your body adjusts to the surgical changes.    Diet: Unless directed otherwise by your surgeon, after surgery you should do the following:  Eat several (4-6) small meals each day.  If you experience difficulty eating, add in supplemental drinks (Boost®, Ensure®, Glucerna®).  If you are having loose stools, your diet should include foods to add bulk to the stool such as applesauce, bananas, cheese, peanut butter, pasta, and potatoes.  Follow a Low Fiber Diet for six (6) weeks. Avoid particular foods including the following:  Raw vegetables, beans, corn, mushrooms, legumes, peas, potato skins, sauerkraut, stewed tomatoes, brussels sprouts  Fresh fruit, dried fruit (such as raisins or prunes), coconut, juices with pulp. (It is okay to eat fruits such as bananas, melons, canned fruits, and avocado.)  Meat with casings (such as hot dogs, kielbasa, sausage), shellfish  Nuts, popcorn, seeds, chunky peanut butter  Coarse whole grains including breads/rolls with nuts, cereals with nuts, coarse whole grains, poppy, sesame seeds    Activity: Walking is encouraged after surgery. Light aerobic activity such as climbing stairs or leisurely bike riding is acceptable but listen to your body and let pain be your guide when reintroducing activity. If it hurts, don't do it. Avoid the following activities for six (6) weeks after surgery:  Lifting objects over 10 pounds  Strenuous activity such as press-ups, push-ups, crunches, sit-ups, vigorous pulling/pushing, and repetitive twisting or bending    Driving: You should not drive a vehicle for the two (2) weeks after surgery or while taking narcotic pain medications. When you return to driving, sit in your vehicle without starting the ignition and step on the brake firmly and rapidly a few times  to assure you are confident with this task. Do not go alone the first time you drive.    Potential Problems:   Constipation is common when taking narcotics. You may feel full and may have gas pains. Drink plenty of non-caffeinated, non-alcoholic beverages and walk as much as possible. You can add a capful of miralax 1-2 times daily with liquids.  If this doesn't help, take senna 2 tablets 1-2 times a day, or docusate 200mg twice daily. Lastly, try milk of magnesium one dose a day.    Bowel obstruction or ileus is characterized by persistent abdominal cramps, bloating, constipation, nausea, or vomiting. If the symptoms are mild, you should restrict your dietary intake to only liquids, and avoid solid food for 2-3 days. If the symptoms are more severe or persist beyond 24 hours, please call your surgeon's office for advice.    Frequent stools and loose stools are best managed by using bulking agents or anti-diarrheal medication. Please call your surgeon if diarrhea is not improving after a few weeks to discuss starting one of the medications below.  Benefiber®, Citrucel®, Fibercon®, Konsyl®, and Metamucil® are bulking agents that are available at most grocery stores and pharmacies. The medication should be mixed using one (1) teaspoon of the powder in the minimum amount of fluid required to dissolve the agent and taken 1-2 times each day. Benefiber® can be alternatively sprinkled over food 1-2 times each day.  Imodium® (loperamide) is also available without a prescription. It is most effective if taken before meals. You should not take more than eight (8) tablets (32 mg) of Imodium in a 24-hour period. Please call your surgeon's office if you start taking Imodium®.     Dehydration can commonly occur, and its symptoms or signs include dark urine, dizziness when standing, dry mouth, increased heart rate, leg cramps, and low volumes (less than 800 ml) of urine. If these occur, you should immediately call your surgeon's  office. To avoid dehydration, you should do the following:  Drink a variety of fluids. Use an oral rehydration salt solution like Pedialyte, G2 Gatorade, Nuun tabs, etc. and sip the solution between meals.  Eat salty foods or add salt to your food.  Use an anti-diarrheal medication or bulking agent as previously instructed by your surgeon.     Wound infection can occur after any surgery and is characterized by increased drainage of cloudy fluid, odor, pain around the wound, redness of the skin around the wound extending 2-3 fingerbreadths outward, or temperature greater than 101 degrees. Please immediately call your surgeon's office if you begin experiencing these symptoms or signs.

## 2025-02-26 NOTE — PROGRESS NOTES
[facility] - Surgery  Urology  Progress Note    Patient Name: Anupam Suarez  MRN: [id number]  Admission Date: 2/25/2025  Hospital Length of Stay: 1 days  Code Status: Full Code   Attending Provider: Santiago Chappell MD   Primary Care Physician: Nova, Primary Doctor    Subjective:     HPI:  No notes on file    Interval History: NAEON. VSS. Thompson output overnight 400cc of clear urine. Removed at bedside this AM. Patient has not ambulated since surgery. Tolerating diet without any n/v. Incisions c/d/I.       Objective:     Temp:  [97.3 °F (36.3 °C)-98.2 °F (36.8 °C)] 97.7 °F (36.5 °C)  Pulse:  [62-85] 76  Resp:  [12-20] 18  SpO2:  [95 %-100 %] 97 %  BP: ()/(56-73) 113/57     Body mass index is 25.74 kg/m².           Drains       Drain  Duration                  Urethral Catheter 02/25/25 Non-latex 14 Fr. 1 day                     Physical Exam  Vitals and nursing note reviewed.   Constitutional:       General: He is not in acute distress.     Appearance: Normal appearance. He is not ill-appearing or toxic-appearing.   Pulmonary:      Effort: Pulmonary effort is normal. No respiratory distress.      Breath sounds: No wheezing.   Abdominal:      General: Abdomen is flat. There is no distension.      Palpations: Abdomen is soft. There is no mass.      Tenderness: There is abdominal tenderness (appropriate post-op tenderness). There is no guarding.      Comments: Incisions c/d/i   Genitourinary:     Comments: Thompson with clear yellow urine; removed at bedside  Musculoskeletal:      Right lower leg: No edema.      Left lower leg: No edema.   Neurological:      General: No focal deficit present.      Mental Status: He is alert and oriented to person, place, and time.      Cranial Nerves: No cranial nerve deficit.      Sensory: No sensory deficit.      Motor: No weakness.   Psychiatric:         Mood and Affect: Mood normal.         Behavior: Behavior normal.         Thought Content: Thought content normal.            Significant Labs:    Recent Lab Results       None            Significant Imaging:  All pertinent imaging results/findings from the past 24 hours have been reviewed.      Assessment/Plan:     * CHLOE (stress urinary incontinence), male  Anupam Suarez is a 64 y.o. M with a past medical history of stress urinary incontinence. He is now s/p insertion of artificial urinary sphincter with Dr. Chappell on 2/25/25.      -- MMPC  -- Home meds restarted  -- Thompson removed this AM; will attempt voiding trial  -- Encourage ambulation  -- Diet: Regular diet  -- Continue anti-nausea regimen PRN    Dispo: Will likely discharge home today pending ambulation, voiding, and tolerating diet      Aki Pierce MD  Urology  [facility]

## 2025-02-26 NOTE — SUBJECTIVE & OBJECTIVE
Interval History: NAEON. VSS. Thompson output overnight 400cc of clear urine. Removed at bedside this AM. Patient has not ambulated since surgery. Tolerating diet without any n/v. Incisions c/d/I.       Objective:     Temp:  [97.3 °F (36.3 °C)-98.2 °F (36.8 °C)] 97.7 °F (36.5 °C)  Pulse:  [62-85] 76  Resp:  [12-20] 18  SpO2:  [95 %-100 %] 97 %  BP: ()/(56-73) 113/57     Body mass index is 25.74 kg/m².           Drains       Drain  Duration                  Urethral Catheter 02/25/25 Non-latex 14 Fr. 1 day                     Physical Exam  Vitals and nursing note reviewed.   Constitutional:       General: He is not in acute distress.     Appearance: Normal appearance. He is not ill-appearing or toxic-appearing.   Pulmonary:      Effort: Pulmonary effort is normal. No respiratory distress.      Breath sounds: No wheezing.   Abdominal:      General: Abdomen is flat. There is no distension.      Palpations: Abdomen is soft. There is no mass.      Tenderness: There is abdominal tenderness (appropriate post-op tenderness). There is no guarding.      Comments: Incisions c/d/i   Genitourinary:     Comments: Thompson with clear yellow urine; removed at bedside  Musculoskeletal:      Right lower leg: No edema.      Left lower leg: No edema.   Neurological:      General: No focal deficit present.      Mental Status: He is alert and oriented to person, place, and time.      Cranial Nerves: No cranial nerve deficit.      Sensory: No sensory deficit.      Motor: No weakness.   Psychiatric:         Mood and Affect: Mood normal.         Behavior: Behavior normal.         Thought Content: Thought content normal.           Significant Labs:    Recent Lab Results       None            Significant Imaging:  All pertinent imaging results/findings from the past 24 hours have been reviewed.

## 2025-02-26 NOTE — PLAN OF CARE
Problem: Adult Inpatient Plan of Care  Goal: Absence of Hospital-Acquired Illness or Injury  Outcome: Progressing     Problem: Adult Inpatient Plan of Care  Goal: Optimal Comfort and Wellbeing  Outcome: Progressing     Problem: Adult Inpatient Plan of Care  Goal: Readiness for Transition of Care  Outcome: Progressing     Problem: Infection  Goal: Absence of Infection Signs and Symptoms  Outcome: Progressing     Problem: Wound  Goal: Optimal Coping  Outcome: Progressing     Problem: Wound  Goal: Optimal Functional Ability  Outcome: Progressing     Problem: Wound  Goal: Absence of Infection Signs and Symptoms  Outcome: Progressing     Problem: Wound  Goal: Improved Oral Intake  Outcome: Progressing     Problem: Wound  Goal: Optimal Pain Control and Function  Outcome: Progressing     Problem: Wound  Goal: Skin Health and Integrity  Outcome: Progressing     Problem: Wound  Goal: Optimal Wound Healing  Outcome: Progressing

## 2025-02-27 NOTE — ANESTHESIA POSTPROCEDURE EVALUATION
Anesthesia Post Evaluation    Patient: Anupam Suarez    Procedure(s) Performed: Procedure(s) (LRB):  INSERTION, ARTIFICIAL URINARY SPHINCTER (N/A)  CYSTOSCOPY, FLEXIBLE    Final Anesthesia Type: general      Patient location during evaluation: PACU  Patient participation: Yes- Able to Participate  Level of consciousness: awake and alert  Post-procedure vital signs: reviewed and stable  Pain management: adequate  Airway patency: patent  GASTON mitigation strategies: Multimodal analgesia, Preoperative use of mandibular advancement devices or oral appliances and Intraoperative administration of CPAP, nasopharyngeal airway, or oral appliance during sedation  PONV status at discharge: No PONV  Anesthetic complications: no      Cardiovascular status: blood pressure returned to baseline, hemodynamically stable and stable  Respiratory status: unassisted and spontaneous ventilation  Hydration status: euvolemic  Follow-up not needed.              Vitals Value Taken Time   /75 02/26/25 08:27   Temp 35.8 °C (96.4 °F) 02/26/25 08:27   Pulse 70 02/26/25 10:43   Resp 18 02/26/25 10:43   SpO2 97 % 02/26/25 10:43         Event Time   Out of Recovery 10:28:00         Pain/Elisa Score: Pain Rating Prior to Med Admin: 0 (2/26/2025  5:56 AM)

## 2025-03-05 ENCOUNTER — PATIENT MESSAGE (OUTPATIENT)
Dept: UROLOGY | Facility: CLINIC | Age: 65
End: 2025-03-05
Payer: COMMERCIAL

## 2025-03-05 ENCOUNTER — NURSE TRIAGE (OUTPATIENT)
Dept: ADMINISTRATIVE | Facility: CLINIC | Age: 65
End: 2025-03-05
Payer: COMMERCIAL

## 2025-03-05 NOTE — TELEPHONE ENCOUNTER
Spoke with wife of pt who reports that he had surgery with Dr. Chappell 2/25. States that he could have possible cellulitis. States that she is not currently with pt, he is currently at work. Advised unable to triage symptoms since pt is not present. Advised will send message to office regarding concern. Also advised pt can call back if he does not hear from office.  Reason for Disposition   [1] Caller is not with the adult (patient) AND [2] probable NON-URGENT symptoms    Protocols used: Information Only Call - No Triage-A-

## 2025-03-05 NOTE — TELEPHONE ENCOUNTER
I did call pt and suggest that he go to the er. Pt declined, he states he will prob start antibiotics. Message was sent to [facility].

## 2025-03-06 ENCOUNTER — TELEPHONE (OUTPATIENT)
Dept: UROLOGY | Facility: CLINIC | Age: 65
End: 2025-03-06
Payer: COMMERCIAL

## 2025-03-06 ENCOUNTER — OFFICE VISIT (OUTPATIENT)
Dept: UROLOGY | Facility: CLINIC | Age: 65
End: 2025-03-06
Payer: COMMERCIAL

## 2025-03-06 DIAGNOSIS — N39.3 SUI (STRESS URINARY INCONTINENCE), MALE: Primary | ICD-10-CM

## 2025-03-06 PROCEDURE — 99999 PR PBB SHADOW E&M-EST. PATIENT-LVL I: CPT | Mod: PBBFAC,,, | Performed by: STUDENT IN AN ORGANIZED HEALTH CARE EDUCATION/TRAINING PROGRAM

## 2025-03-06 PROCEDURE — 99499 UNLISTED E&M SERVICE: CPT | Mod: S$GLB,,, | Performed by: STUDENT IN AN ORGANIZED HEALTH CARE EDUCATION/TRAINING PROGRAM

## 2025-03-06 RX ORDER — NYSTATIN 100000 [USP'U]/G
POWDER TOPICAL 3 TIMES DAILY
Qty: 60 G | Refills: 2 | Status: SHIPPED | OUTPATIENT
Start: 2025-03-06 | End: 2025-04-05

## 2025-03-06 NOTE — PROGRESS NOTES
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  He is status post AUS placement on 02/25/2025.  He reports that he has been recovering well.  He did not require any pain medication after leaving the hospital.  He has had steady leakage and denies fevers, dysuria or hematuria.  He presents 1 week early for his postoperative visit today due to concerns of cellulitis on his penis.    On exam, he has a large amount of razor burn on his inner thighs and suprapubic area.  He has no findings concerning for infection over either of his incisions or in his scrotum.  His incisions are healing very well.  His pump is easily palpable and deactivated.  He does have some raw and erythematous skin on the right lateral aspect of his penis.  This appears to be fungal in nature.    Plan:   -He appears to be healing well without any signs of infection of his device.  He seems to be have some fungal irritation of the shaft of his penis, likely due to a combination of razor burn and dampness from the urine.  We will treat this with t.i.d. nystatin.  -RTC for his already scheduled 6 week activation visit.  -Instructed him to call with any further concerns or if his condition does not improve.

## 2025-03-06 NOTE — TELEPHONE ENCOUNTER
Called patient due to concerns of cellulitis.   He describes irritation and redness of the shaft of his penis. Denies erythema or induration over his incisions. Denies fevers. Pain well controlled, not taking narcotics.   While this may represent irritation/cellulitis from shaving pre-op, I would like to see him in person for evaluation.  He will come into clinic today to be evaluated.

## 2025-03-11 ENCOUNTER — TELEPHONE (OUTPATIENT)
Dept: UROLOGY | Facility: HOSPITAL | Age: 65
End: 2025-03-11
Payer: COMMERCIAL

## 2025-03-11 DIAGNOSIS — N39.3 SUI (STRESS URINARY INCONTINENCE), MALE: Primary | ICD-10-CM

## 2025-03-11 RX ORDER — SULFAMETHOXAZOLE AND TRIMETHOPRIM 800; 160 MG/1; MG/1
1 TABLET ORAL 2 TIMES DAILY
Qty: 28 TABLET | Refills: 0 | Status: SHIPPED | OUTPATIENT
Start: 2025-03-11 | End: 2025-03-25

## 2025-03-12 NOTE — TELEPHONE ENCOUNTER
"Urology Progress Note    -- Sent 14 days of Bactrim to home pharmacy.          Kurtis Madrigal" [facility], PGY-2       "

## 2025-03-14 ENCOUNTER — OFFICE VISIT (OUTPATIENT)
Dept: UROLOGY | Facility: CLINIC | Age: 65
End: 2025-03-14
Payer: COMMERCIAL

## 2025-03-14 VITALS
SYSTOLIC BLOOD PRESSURE: 134 MMHG | BODY MASS INDEX: 24.81 KG/M2 | DIASTOLIC BLOOD PRESSURE: 80 MMHG | WEIGHT: 170.44 LBS | HEART RATE: 69 BPM

## 2025-03-14 DIAGNOSIS — N39.3 SUI (STRESS URINARY INCONTINENCE), MALE: Primary | ICD-10-CM

## 2025-03-14 PROCEDURE — 99999 PR PBB SHADOW E&M-EST. PATIENT-LVL III: CPT | Mod: PBBFAC,,, | Performed by: STUDENT IN AN ORGANIZED HEALTH CARE EDUCATION/TRAINING PROGRAM

## 2025-03-14 RX ORDER — BACITRACIN 500 [USP'U]/G
OINTMENT TOPICAL 3 TIMES DAILY
Qty: 28 G | Refills: 2 | Status: SHIPPED | OUTPATIENT
Start: 2025-03-14

## 2025-03-14 NOTE — PROGRESS NOTES
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  He is status post AUS placement on 02/25/2025.    He has had steady leakage and denies fevers, dysuria or hematuria.    Since his last visit, he has started bactrim in addition to his nystatin due to penile cellulitis.   On exam, he is much improved.  He has no findings concerning for infection over either of his incisions or in his scrotum.  His incisions are healing very well.  His pump is easily palpable and deactivated.      Plan:   -He appears to be healing well without any signs of infection of his device. His penile cellulitis is improving with nystatin and bactrim. Finish those courses.   -RTC for his already scheduled 6 week activation visit.  -Instructed him to call with any further concerns or if his condition does not improve.

## 2025-04-11 ENCOUNTER — OFFICE VISIT (OUTPATIENT)
Dept: UROLOGY | Facility: CLINIC | Age: 65
End: 2025-04-11
Payer: COMMERCIAL

## 2025-04-11 VITALS
SYSTOLIC BLOOD PRESSURE: 123 MMHG | WEIGHT: 171.75 LBS | DIASTOLIC BLOOD PRESSURE: 75 MMHG | HEART RATE: 63 BPM | BODY MASS INDEX: 25 KG/M2

## 2025-04-11 DIAGNOSIS — N39.3 SUI (STRESS URINARY INCONTINENCE), MALE: Primary | ICD-10-CM

## 2025-04-11 PROCEDURE — 99499 UNLISTED E&M SERVICE: CPT | Mod: S$GLB,,, | Performed by: STUDENT IN AN ORGANIZED HEALTH CARE EDUCATION/TRAINING PROGRAM

## 2025-04-11 PROCEDURE — 99999 PR PBB SHADOW E&M-EST. PATIENT-LVL III: CPT | Mod: PBBFAC,,, | Performed by: STUDENT IN AN ORGANIZED HEALTH CARE EDUCATION/TRAINING PROGRAM

## 2025-04-13 ENCOUNTER — PATIENT MESSAGE (OUTPATIENT)
Dept: UROLOGY | Facility: CLINIC | Age: 65
End: 2025-04-13
Payer: COMMERCIAL

## 2025-04-18 NOTE — PROGRESS NOTES
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  He is status post AUS placement on 02/25/2025.    He has had steady leakage and denies fevers, dysuria or hematuria.    His previous penile shaft cellulitis has resolved    Plan:   - His AUS was activated today without issue.  He was instructed on how to cycle the device and demonstrated the ability to do so.  We again reviewed the need for using a bicycle seat with a cut-out and that he should wait at least a few more weeks before resuming cycling.  -RTC for a 3 month postoperative visit.  -Instructed him to call with any further concerns.

## (undated) DEVICE — CATH URETH FOLEY 2W 12FR 10ML

## (undated) DEVICE — PENCIL ROCKER SWITCH 10FT CORD

## (undated) DEVICE — TUBING SUCTION STRAIGHT .25X20

## (undated) DEVICE — CATH SELECTSILICON FOL 14F 10M

## (undated) DEVICE — Device

## (undated) DEVICE — LOOP VESSEL YELLOW MAXI

## (undated) DEVICE — GAUZE SPONGE BULKEE 6X6.75IN

## (undated) DEVICE — NDL N SERIES MICRO-DISSECTION

## (undated) DEVICE — SYR 10CC LUER LOCK

## (undated) DEVICE — PLUG CATH DRAIN TUBE PROTECTOR

## (undated) DEVICE — APPLICATOR CHLORAPREP ORN 26ML

## (undated) DEVICE — DRAPE UNDERBUTTOCKS PCH STRL

## (undated) DEVICE — GAUZE FLUFF XXLG 36X36 2 PLY

## (undated) DEVICE — NDL 18GA X1 1/2 REG BEVEL

## (undated) DEVICE — BLADE SURG #15 CARBON STEEL

## (undated) DEVICE — ADHESIVE DERMABOND ADVANCED

## (undated) DEVICE — SUT 4-0 VICRYL / SH

## (undated) DEVICE — SYR IRRIGATION BULB STER 60ML

## (undated) DEVICE — SYR ONLY LUER LOCK 20CC

## (undated) DEVICE — SUT VICRYL 3-0 27 SH

## (undated) DEVICE — PANTIES FEMININE NAPKIN LG/XLG

## (undated) DEVICE — TRAY CATH 1-LYR URIMTR 16FR

## (undated) DEVICE — LUBRICANT SURGILUBE 2 OZ

## (undated) DEVICE — NDL MICRODISSECTION 3CM 3/32

## (undated) DEVICE — STAPLER SKIN PROXIMATE WIDE

## (undated) DEVICE — CATH IV INTROCAN 14G X 2.

## (undated) DEVICE — PLUG CATHETER STERILE FOLEY

## (undated) DEVICE — PACK LAPAROSCOPY/PELVISCOPY II

## (undated) DEVICE — SUT MONOCRYL 4-0 PS-2

## (undated) DEVICE — LOOP STERION MAXI YEL 1X406MM

## (undated) DEVICE — KIT SURGIFLO HEMOSTATIC MATRIX

## (undated) DEVICE — RETRACTOR DEEP SCROTAL INF LF

## (undated) DEVICE — LEGGING CLEAR POLY 2/PACK

## (undated) DEVICE — TRAY MINOR GEN SURG OMC

## (undated) DEVICE — SYR 30CC LUER LOCK